# Patient Record
Sex: MALE | Race: BLACK OR AFRICAN AMERICAN | Employment: FULL TIME | URBAN - METROPOLITAN AREA
[De-identification: names, ages, dates, MRNs, and addresses within clinical notes are randomized per-mention and may not be internally consistent; named-entity substitution may affect disease eponyms.]

---

## 2018-04-30 ENCOUNTER — HOSPITAL ENCOUNTER (OUTPATIENT)
Dept: PHYSICAL THERAPY | Age: 48
Discharge: HOME | End: 2018-04-30
Payer: COMMERCIAL

## 2018-04-30 PROCEDURE — 97110 THERAPEUTIC EXERCISES: CPT

## 2018-04-30 PROCEDURE — 97161 PT EVAL LOW COMPLEX 20 MIN: CPT
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
## 2018-05-04 ENCOUNTER — HOSPITAL ENCOUNTER (OUTPATIENT)
Dept: PHYSICAL THERAPY | Age: 48
Discharge: HOME | End: 2018-05-04
Payer: COMMERCIAL

## 2018-05-04 PROCEDURE — 97140 MANUAL THERAPY 1/> REGIONS: CPT | Performed by: PHYSICAL THERAPIST

## 2018-05-04 PROCEDURE — 97110 THERAPEUTIC EXERCISES: CPT | Performed by: PHYSICAL THERAPIST

## 2018-05-04 NOTE — PROGRESS NOTES
[facility]  [address], [address] [facility]    OUTPATIENT physical Therapy DAILY Treatment NOTe  Visit: 2    NAME: Em Alberto AGE: 57 y.o. GENDER: male  DATE: 5/4/2018  REFERRING PHYSICIAN: Fernanda Najera MD      GOALS  Short term goals  Time frame: 3 weeks  1. Patient will be compliant and independent with the initial HEP as evidenced by being able to perform without cueing. 2. Patient will report a 50% improvement in symptoms. 3. Patient report a 50% improvement in sleeping. 4. Patient will tolerate 15 minutes of clinic activities before onset of symptoms.      Long term goals  Time frame: 6 weeks  1. Patient will report pain level decrease to 2/10 to allow increased ease of movement. 2. Patient will have an improved score on the [facility] outcome measure by 10 points to demonstrate an increase in functional activity tolerance. 3. Patient will return to work without being limited by symptoms. 4. Patient will sleep 6-8 hours without being interrupted by pain. SUBJECTIVE:   \"It feels a little better. \"    Pain In: 6-7/10 bilateral neck (left>right) and right mid back    OBJECTIVE DATA SUMMARY:   Objective data from initial evaluation:  EXAMINATION/PRESENTATION/DECISION MAKING:   Pain:  Location:neck, low back and right flank   Quality: aching and burning  Now:8/10  Best: 5/6, when resting or little activity  Factors that improve pain: rest, medication:used and beneficial     Posture:   Rounded shoulders and head forward     Strength:   B/L UE strength WNL     Range of Motion:   Trunk flex/ext 50% of normal painful  Right and Left SB 50% of normal painful     Spinal Assessment: WNL     Joint Mobility:   Lumbar hypomobility with P-A pressure  Cx distraction, gr 1 painful     Palpation:   TTP, suboccipitals, cx paraspinals,rhomboids, right latissimus dorsi, lumbar paraspinals     Neurologic Assessment:                         Tone:  WNL Sensation: normal                         Reflexes: not tested     Special Tests:    + B/L spurlings     Mobility:                         Transitional Movements: WNL                         Gait: WNL     Balance:   WNL     Functional Measure:      In compliance with CMSs Claims Based Outcome Reporting, the following G-code set was chosen for this patient based on their primary functional limitation being treated:     The outcome measure chosen to determine the severity of the functional limitation was the Standard [place] Pain Questionaire with a score of 13/24 which was correlated with the impairment scale.     · Mobility - Walking and Moving Around:                           - CURRENT STATUS:               CK - 40%-59% impaired, limited or restricted                          - GOAL STATUS:                      CI - 1%-19% impaired, limited or restricted                         - D/C STATUS:                                           ---------------To be determined---------------     TREATMENT/INTERVENTION:  Modalities (Rationale):  to decrease pain and muscle guarding  MHP to neck and low back for 10 minutes at end of session; no skin irritation noted     Therapeutic Exercises:  HEP provided on evaluation:  KTC, LTR, Bridges, Cervical flexion and rotation over rolled towel, UT stretch, shoulder blade squeeze, Shoulder shrug, side bend  Added to HEP 5/4/18: Chin tucks, UT stretch, levator scapulae stretch, scalene stretch, lower cervical/upper thoracic stretch, sidelying lumbar rotation stretch, seated trunk rotation stretch, angry cat stretch     LTR  SKTC  Bridges  Thoracolumbar side bend with arms behind head: 5 reps with 5 second holds  Seated trunk rotation: 5 reps with 5 second holds  Sidelying right lumbar rotation stretch    UT stretch: 3 reps with 15 second holds  Levator scapulae stretch: 3 reps with 15 second holds  Scalene stretch: 3 reps with 15 second holds  Lower cervical/upper thoracic stretch: 5 reps with 10 second holds  Shoulder blade squeeze: 10 reps  Shoulder shrugs: 10 reps  Chin tucks: 10 reps with 5 second holds    Angry cat stretch: 10 reps  Forward flexion over blue physioball: 10 reps    Manual Therapy:  STM to bilateral UT, levator scapulae, and cervical paraspinals  Instrument assisted soft tissue mobilization to bilateral UT, levator scapulae, and cervical paraspinals in sitting as well as right thoracic paraspinals in prone. Patient educated on purpose and affect of intervention. Patient visualized affect post intervention and verbalized good understanding.     Neuro Re-Education:  Discussed sitting with lumbar support to realign sitting posture     Activity tolerance and post treatment pain report:   Good  Pain Out: 6/10    Education:  Education was provided to the patient on the following topics  [x]    No changes were made to the home exercise program.  []    The following changes were made to the home exercise program  Patient verbalized understanding of the topics presented. ASSESSMENT:   Patient returns following initial evaluation. Patient presents with decreased pain in neck and mid back. Patient demonstrates pain at bilateral neck (left>right) and right mid back area. Patient does not report any headaches. Patient tolerated additional exercises well; patient provided with additional HEP with good understanding. Patient tolerated IASTM to bilateral UT, levator scapulae, cervical paraspinals, and right thoracic paraspinals well with pain relief noted. Patients progression toward goals is as follows:  [x]     Improving appropriately and progressing toward goals  []     Improving slowly and progressing toward goals  []     Not making progress toward goals and plan of care will be adjusted    PLAN OF CARE:   Patient continues to benefit from skilled intervention to address the above impairments.     [x]    Continue treatment per established plan of care.  []     Recommend the following changes to the plan of care    Recommendations/Intent for next treatment: Rows and pull downs at quantum; reassess response to IASTM    Yanely Zavala, PT   Time Calculation: 50 mins  Patient Time in clinic:   Start Time: 1430   Stop Time: 1520

## 2018-05-08 ENCOUNTER — HOSPITAL ENCOUNTER (OUTPATIENT)
Dept: PHYSICAL THERAPY | Age: 48
Discharge: HOME | End: 2018-05-08
Payer: COMMERCIAL

## 2018-05-08 PROCEDURE — 97140 MANUAL THERAPY 1/> REGIONS: CPT

## 2018-05-08 PROCEDURE — 97110 THERAPEUTIC EXERCISES: CPT

## 2018-05-08 NOTE — PROGRESS NOTES
[facility]  [address], [address] [facility]    OUTPATIENT physical Therapy DAILY Treatment NOTe  Visit: 3    NAME: Kuldeep Hand AGE: 57 y.o. GENDER: male  DATE: 5/8/2018  REFERRING PHYSICIAN: Ottoniel Tony MD      GOALS  Short term goals  Time frame: 3 weeks  1. Patient will be compliant and independent with the initial HEP as evidenced by being able to perform without cueing. 2. Patient will report a 50% improvement in symptoms. 3. Patient report a 50% improvement in sleeping. 4. Patient will tolerate 15 minutes of clinic activities before onset of symptoms.      Long term goals  Time frame: 6 weeks  1. Patient will report pain level decrease to 2/10 to allow increased ease of movement. 2. Patient will have an improved score on the [facility] outcome measure by 10 points to demonstrate an increase in functional activity tolerance. 3. Patient will return to work without being limited by symptoms. 4. Patient will sleep 6-8 hours without being interrupted by pain. SUBJECTIVE:   \"It feels a little better. \"    Pain In: 6-7/10 bilateral neck (left>right) and right mid back    OBJECTIVE DATA SUMMARY:   Objective data from initial evaluation:  EXAMINATION/PRESENTATION/DECISION MAKING:   Pain:  Location:neck, low back and right flank   Quality: aching and burning  Now:8/10  Best: 5/6, when resting or little activity  Factors that improve pain: rest, medication:used and beneficial     Posture:   Rounded shoulders and head forward     Strength:   B/L UE strength WNL     Range of Motion:   Trunk flex/ext 50% of normal painful  Right and Left SB 50% of normal painful     Spinal Assessment: WNL     Joint Mobility:   Lumbar hypomobility with P-A pressure  Cx distraction, gr 1 painful     Palpation:   TTP, suboccipitals, cx paraspinals,rhomboids, right latissimus dorsi, lumbar paraspinals     Neurologic Assessment:                         Tone:  WNL Sensation: normal                         Reflexes: not tested     Special Tests:    + B/L spurlings     Mobility:                         Transitional Movements: WNL                         Gait: WNL     Balance:   WNL     Functional Measure:      In compliance with CMSs Claims Based Outcome Reporting, the following G-code set was chosen for this patient based on their primary functional limitation being treated:     The outcome measure chosen to determine the severity of the functional limitation was the Standard [place] Pain Questionaire with a score of 13/24 which was correlated with the impairment scale.     · Mobility - Walking and Moving Around:                           - CURRENT STATUS:               CK - 40%-59% impaired, limited or restricted                          - GOAL STATUS:                      CI - 1%-19% impaired, limited or restricted                         - D/C STATUS:                                           ---------------To be determined---------------     TREATMENT/INTERVENTION:  Modalities (Rationale):  to decrease pain and muscle guarding  MHP to neck and low back for 10 minutes at end of session; no skin irritation noted     Therapeutic Exercises:  HEP provided on evaluation:  KTC, LTR, Bridges, Cervical flexion and rotation over rolled towel, UT stretch, shoulder blade squeeze, Shoulder shrug, side bend  Added to HEP 5/4/18: Chin tucks, UT stretch, levator scapulae stretch, scalene stretch, lower cervical/upper thoracic stretch, sidelying lumbar rotation stretch, seated trunk rotation stretch, angry cat stretch     LTR  SKTC  Bridges  Single leg right rotation with 20 sec     UT stretch: 3 reps with 15 second holds  Levator scapulae stretch: 3 reps with 15 second holds  Scalene stretch: 3 reps with 15 second holds  Lower cervical/upper thoracic stretch: 5 reps with 10 second holds  Shoulder blade squeeze: 10 reps  Shoulder shrugs: 10 reps  Chin tucks: 10 reps with 5 second holds    Flexion/ext over back of chair x 7 reps  Thoracolumbar side bend with arms behind head: 5 reps with 5 second holds  Seated trunk rotation: 5 reps with 5 second holds      Standing  Rows with green TB x 10 reps  Quantum pull downs 20# x 10 reps    Angry cat stretch: 10 reps  Child's pos x 3 reps with 15 sec hold  Forward flexion over blue physioball: 10 reps    Manual Therapy:  STM to bilateral UT, levator scapulae, and cervical paraspinals  Gr 1 Cx distraction and Cx SG, poor tolerance. Instrument assisted soft tissue mobilization to bilateral UT, levator scapulae, and cervical paraspinals in sitting as well as right thoracic paraspinals in prone. Patient educated on purpose and affect of intervention. Patient visualized affect post intervention and verbalized good understanding.     Neuro Re-Education:  Discussed sitting with lumbar support to realign sitting posture     Activity tolerance and post treatment pain report:   Good  Pain Out: 6/10    Education:  Education was provided to the patient on the following topics  [x]    No changes were made to the home exercise program.  []    The following changes were made to the home exercise program  Patient verbalized understanding of the topics presented. ASSESSMENT:    Patient presents with decreased pain in neck and mid back. Patient demonstrates pain at bilateral neck  and right mid back area. . Pt with good HEP compliance. Patient tolerated IASTM to bilateral UT, levator scapulae, cervical paraspinals. Advanced clinic activities as above, with good tolerance. Patients progression toward goals is as follows:  [x]     Improving appropriately and progressing toward goals  []     Improving slowly and progressing toward goals  []     Not making progress toward goals and plan of care will be adjusted    PLAN OF CARE:   Patient continues to benefit from skilled intervention to address the above impairments.     [x]    Continue treatment per established plan of care.   []     Recommend the following changes to the plan of care    Recommendations/Intent for next treatment: continue to advance exercises, IASTM and possible ES trial    Lit Yoder, PT   Time Calculation: 50 mins  Patient Time in clinic:   Start Time: 1300   Stop Time: 1350

## 2018-05-10 ENCOUNTER — HOSPITAL ENCOUNTER (OUTPATIENT)
Dept: PHYSICAL THERAPY | Age: 48
Discharge: HOME | End: 2018-05-10
Payer: COMMERCIAL

## 2018-05-10 PROCEDURE — 97140 MANUAL THERAPY 1/> REGIONS: CPT | Performed by: PHYSICAL THERAPIST

## 2018-05-10 PROCEDURE — 97112 NEUROMUSCULAR REEDUCATION: CPT | Performed by: PHYSICAL THERAPIST

## 2018-05-10 NOTE — PROGRESS NOTES
[facility]  [address], [address] [facility]    OUTPATIENT physical Therapy DAILY Treatment NOTe  Visit: 4    NAME: Christa Coleman AGE: 57 y.o. GENDER: male  DATE: 5/10/2018  REFERRING PHYSICIAN: Amy Bajwa MD      GOALS  Short term goals  Time frame: 3 weeks  1. Patient will be compliant and independent with the initial HEP as evidenced by being able to perform without cueing. 2. Patient will report a 50% improvement in symptoms. 3. Patient report a 50% improvement in sleeping. 4. Patient will tolerate 15 minutes of clinic activities before onset of symptoms.      Long term goals  Time frame: 6 weeks  1. Patient will report pain level decrease to 2/10 to allow increased ease of movement. 2. Patient will have an improved score on the [employer] outcome measure by 10 points to demonstrate an increase in functional activity tolerance. 3. Patient will return to work without being limited by symptoms. 4. Patient will sleep 6-8 hours without being interrupted by pain. SUBJECTIVE:   \"It feels a little better. \"    Pain In: 6-7/10 bilateral neck (left>right) and 5/10 right mid back    OBJECTIVE DATA SUMMARY:   Objective data from initial evaluation:  EXAMINATION/PRESENTATION/DECISION MAKING:   Pain:  Location:neck, low back and right flank   Quality: aching and burning  Now:8/10  Best: 5/6, when resting or little activity  Factors that improve pain: rest, medication:used and beneficial     Posture:   Rounded shoulders and head forward     Strength:   B/L UE strength WNL     Range of Motion:   Trunk flex/ext 50% of normal painful  Right and Left SB 50% of normal painful     Spinal Assessment: WNL     Joint Mobility:   Lumbar hypomobility with P-A pressure  Cx distraction, gr 1 painful     Palpation:   TTP, suboccipitals, cx paraspinals,rhomboids, right latissimus dorsi, lumbar paraspinals     Neurologic Assessment:                         Tone:  WNL Sensation: normal                         Reflexes: not tested     Special Tests:    + B/L spurlings     Mobility:                         Transitional Movements: WNL                         Gait: WNL     Balance:   WNL     Functional Measure:      In compliance with CMSs Claims Based Outcome Reporting, the following G-code set was chosen for this patient based on their primary functional limitation being treated:     The outcome measure chosen to determine the severity of the functional limitation was the Standard [place] Pain Questionaire with a score of 13/24 which was correlated with the impairment scale.     · Mobility - Walking and Moving Around:                           - CURRENT STATUS:               CK - 40%-59% impaired, limited or restricted                          - GOAL STATUS:                      CI - 1%-19% impaired, limited or restricted                         - D/C STATUS:                           ---------------To be determined---------------     TREATMENT/INTERVENTION:  Modalities (Rationale):  to decrease pain and muscle guarding  MHP to neck and low back for 10 minutes at end of session; no skin irritation noted     Therapeutic Exercises:  HEP provided on evaluation:  KTC, LTR, Bridges, Cervical flexion and rotation over rolled towel, UT stretch, shoulder blade squeeze, Shoulder shrug, side bend  Added to HEP 5/4/18: Chin tucks, UT stretch, levator scapulae stretch, scalene stretch, lower cervical/upper thoracic stretch, sidelying lumbar rotation stretch, seated trunk rotation stretch, angry cat stretch     Exercises in BOLD performed this date:     LTR  SKTC  Bridges  Single leg right rotation with 20 sec     UT stretch: 3 reps with 15 second holds  Levator scapulae stretch: 3 reps with 15 second holds  Scalene stretch: 3 reps with 15 second holds  Lower cervical/upper thoracic stretch: 5 reps with 10 second holds  Shoulder blade squeeze: 10 reps  Shoulder shrugs: 10 reps  Chin tucks: 10 reps with 5 second holds    Flexion/ext over back of chair x 7 reps  Thoracolumbar side bend with arms behind head: 5 reps with 5 second holds  Seated trunk rotation: 5 reps with 5 second holds    Standing  Rows with green TB x 10 reps  Quantum pull downs 20# x 10 reps    Angry cat stretch: 10 reps  Child's pos x 3 reps with 15 sec hold  Forward flexion over blue physioball: 10 reps    Manual Therapy:  STM to bilateral UT, levator scapulae, and cervical paraspinals  Manual cervical traction with suboccipital release  Instrument assisted soft tissue mobilization to bilateral UT, levator scapulae, and cervical paraspinals in sitting as well as right thoracic paraspinals in prone. Patient educated on purpose and affect of intervention. Patient visualized affect post intervention and verbalized good understanding.     Neuro Re-Education:  Discussed sitting with lumbar support to realign sitting posture     Activity tolerance and post treatment pain report:   Good  Pain Out: 6/10    Education:  Education was provided to the patient on the following topics  [x]    No changes were made to the home exercise program.  []    The following changes were made to the home exercise program  Patient verbalized understanding of the topics presented. ASSESSMENT:    Patient presents with decreased pain in neck and mid back. Patient reports no headaches. Patient with good HEP compliance. Patient with pain relief following IASTM to bilateral UT, levator scapulae, and cervical paraspinals. Patient tolerated clinic activities well. Patients progression toward goals is as follows:  [x]     Improving appropriately and progressing toward goals  []     Improving slowly and progressing toward goals  []     Not making progress toward goals and plan of care will be adjusted    PLAN OF CARE:   Patient continues to benefit from skilled intervention to address the above impairments.     [x]    Continue treatment per established plan of care.   []     Recommend the following changes to the plan of care    Recommendations/Intent for next treatment: continue to advance exercises, IASTM and possible ES trial    Ramona Oliveira, PT   Time Calculation: 30 mins  Patient Time in clinic:   Start Time: 1330   Stop Time: 1400

## 2018-05-14 ENCOUNTER — HOSPITAL ENCOUNTER (OUTPATIENT)
Dept: PHYSICAL THERAPY | Age: 48
Discharge: HOME | End: 2018-05-14
Payer: COMMERCIAL

## 2018-05-14 PROCEDURE — 97014 ELECTRIC STIMULATION THERAPY: CPT

## 2018-05-14 PROCEDURE — 97110 THERAPEUTIC EXERCISES: CPT

## 2018-05-14 PROCEDURE — 97140 MANUAL THERAPY 1/> REGIONS: CPT

## 2018-05-14 NOTE — PROGRESS NOTES
[facility]  [address], [address] [facility]    OUTPATIENT physical Therapy DAILY Treatment NOTe  Visit: 5    NAME: Leighton Maya AGE: 57 y.o. GENDER: male  DATE: 5/21/2018  REFERRING PHYSICIAN: Rufina Washington MD      GOALS  Short term goals  Time frame: 3 weeks  1. Patient will be compliant and independent with the initial HEP as evidenced by being able to perform without cueing. 2. Patient will report a 50% improvement in symptoms. 3. Patient report a 50% improvement in sleeping. 4. Patient will tolerate 15 minutes of clinic activities before onset of symptoms.      Long term goals  Time frame: 6 weeks  1. Patient will report pain level decrease to 2/10 to allow increased ease of movement. 2. Patient will have an improved score on the [employer] [facility] outcome measure by 10 points to demonstrate an increase in functional activity tolerance. 3. Patient will return to work without being limited by symptoms. 4. Patient will sleep 6-8 hours without being interrupted by pain. SUBJECTIVE:   \"It feels a little better. \"    Pain In: 5/10 bilateral neck (left>right) and 5/10 right mid back    OBJECTIVE DATA SUMMARY:   Objective data from initial evaluation:  EXAMINATION/PRESENTATION/DECISION MAKING:   Pain:  Location:neck, low back and right flank   Quality: aching and burning  Now:8/10  Best: 5/6, when resting or little activity  Factors that improve pain: rest, medication:used and beneficial     Posture:   Rounded shoulders and head forward     Strength:   B/L UE strength WNL     Range of Motion:   Trunk flex/ext 50% of normal painful  Right and Left SB 50% of normal painful     Spinal Assessment: WNL     Joint Mobility:   Lumbar hypomobility with P-A pressure  Cx distraction, gr 1 painful     Palpation:   TTP, suboccipitals, cx paraspinals,rhomboids, right latissimus dorsi, lumbar paraspinals     Neurologic Assessment:                         Tone:  WNL Sensation: normal                         Reflexes: not tested     Special Tests:    + B/L spurlings     Mobility:                         Transitional Movements: WNL                         Gait: WNL     Balance:   WNL     Functional Measure:      In compliance with CMSs Claims Based Outcome Reporting, the following G-code set was chosen for this patient based on their primary functional limitation being treated:     The outcome measure chosen to determine the severity of the functional limitation was the Standard [place] Pain Questionaire with a score of 13/24 which was correlated with the impairment scale.     · Mobility - Walking and Moving Around:                           - CURRENT STATUS:               CK - 40%-59% impaired, limited or restricted                          - GOAL STATUS:                      CI - 1%-19% impaired, limited or restricted                         - D/C STATUS:                           ---------------To be determined---------------     TREATMENT/INTERVENTION:  Modalities (Rationale):  to decrease pain and muscle guarding  MHP to neck and low back for 10 minutes at end of session; no skin irritation noted    ES B/L UT, levator and right thoracic and lumbar     Therapeutic Exercises:  HEP provided on evaluation:  KTC, LTR, Bridges, Cervical flexion and rotation over rolled towel, UT stretch, shoulder blade squeeze, Shoulder shrug, side bend  Added to HEP 5/4/18: Chin tucks, UT stretch, levator scapulae stretch, scalene stretch, lower cervical/upper thoracic stretch, sidelying lumbar rotation stretch, seated trunk rotation stretch, angry cat stretch     Exercises in BOLD performed this date:     LTR  SKTC  Bridges  Single leg right rotation with 20 sec     UT stretch: 3 reps with 15 second holds  Levator scapulae stretch: 3 reps with 15 second holds  Scalene stretch: 3 reps with 15 second holds  Lower cervical/upper thoracic stretch: 5 reps with 10 second holds  Shoulder blade squeeze: 10 reps  Shoulder shrugs: 10 reps  Chin tucks: 10 reps with 5 second holds    Flexion/ext over back of chair x 7 reps  Thoracolumbar side bend with arms behind head: 5 reps with 5 second holds  Seated trunk rotation: 5 reps with 5 second holds    Standing  Rows with blue TB x 10 reps 2 sets  Pull downs  With blue TB  x 10 reps x 2 sets    Angry cat stretch: 10 reps  Child's pos x 3 reps with 15 sec hold  Forward flexion over blue physioball: 10 reps    Manual Therapy:  STM to bilateral UT, levator scapulae, and cervical paraspinals  Manual cervical traction with suboccipital release  Instrument assisted soft tissue mobilization to bilateral UT, levator scapulae, and cervical paraspinals in sitting as well as right thoracic paraspinals in prone. Patient educated on purpose and affect of intervention. Patient visualized affect post intervention and verbalized good understanding.     Neuro Re-Education:  Discussed sitting with lumbar support to realign sitting posture     Activity tolerance and post treatment pain report:   Good  Pain Out: 6/10    Education:  Education was provided to the patient on the following topics  [x]    No changes were made to the home exercise program.  []    The following changes were made to the home exercise program  Patient verbalized understanding of the topics presented. ASSESSMENT:    Patient presents with decreased pain in neck and mid back. Patient with good HEP compliance. Patient with pain relief following IASTM to bilateral UT, levator scapulae, and cervical paraspinals. Patient tolerated advance in clinic activities well. ES trial, assess response.     Patients progression toward goals is as follows:  [x]     Improving appropriately and progressing toward goals  []     Improving slowly and progressing toward goals  []     Not making progress toward goals and plan of care will be adjusted    PLAN OF CARE:   Patient continues to benefit from skilled intervention to address the above impairments. [x]    Continue treatment per established plan of care.   []     Recommend the following changes to the plan of care    Recommendations/Intent for next treatment: continue to advance exercises, IASTM assess ES trial    Brenda Kelly, PT   Time Calculation: 40 mins  Patient Time in clinic:   Start Time: 1330   Stop Time: 1410

## 2018-05-18 ENCOUNTER — HOSPITAL ENCOUNTER (OUTPATIENT)
Dept: PHYSICAL THERAPY | Age: 48
Discharge: HOME | End: 2018-05-18
Payer: COMMERCIAL

## 2018-05-18 PROCEDURE — 97140 MANUAL THERAPY 1/> REGIONS: CPT | Performed by: PHYSICAL THERAPIST

## 2018-05-18 PROCEDURE — 97014 ELECTRIC STIMULATION THERAPY: CPT | Performed by: PHYSICAL THERAPIST

## 2018-05-18 NOTE — PROGRESS NOTES
[facility]  [address], [address] [facility]    OUTPATIENT physical Therapy DAILY Treatment NOTe  Visit: 6    NAME: Lizzy Stephens AGE: 57 y.o. GENDER: male  DATE: 5/18/2018  REFERRING PHYSICIAN: Lyssa Reid MD      GOALS  Short term goals  Time frame: 3 weeks  1. Patient will be compliant and independent with the initial HEP as evidenced by being able to perform without cueing. 2. Patient will report a 50% improvement in symptoms. 3. Patient report a 50% improvement in sleeping. 4. Patient will tolerate 15 minutes of clinic activities before onset of symptoms.      Long term goals  Time frame: 6 weeks  1. Patient will report pain level decrease to 2/10 to allow increased ease of movement. 2. Patient will have an improved score on the [employer] outcome measure by 10 points to demonstrate an increase in functional activity tolerance. 3. Patient will return to work without being limited by symptoms. 4. Patient will sleep 6-8 hours without being interrupted by pain. SUBJECTIVE:   \"I went back to work on Wednesday so I'm sore. \"    Pain In: 6-7/10 bilateral neck (left>right) and 5/10 right mid back    OBJECTIVE DATA SUMMARY:   Objective data from initial evaluation:  EXAMINATION/PRESENTATION/DECISION MAKING:   Pain:  Location:neck, low back and right flank   Quality: aching and burning  Now:8/10  Best: 5/6, when resting or little activity  Factors that improve pain: rest, medication:used and beneficial     Posture:   Rounded shoulders and head forward     Strength:   B/L UE strength WNL     Range of Motion:   Trunk flex/ext 50% of normal painful  Right and Left SB 50% of normal painful     Spinal Assessment:    WNL     Joint Mobility:   Lumbar hypomobility with P-A pressure  Cx distraction, gr 1 painful     Palpation:   TTP, suboccipitals, cx paraspinals,rhomboids, right latissimus dorsi, lumbar paraspinals     Neurologic Assessment: Tone: WNL                         Sensation: normal                         Reflexes: not tested     Special Tests:    + B/L spurlings     Mobility:                         Transitional Movements: WNL                         Gait: WNL     Balance:   WNL     Functional Measure:      In compliance with CMSs Claims Based Outcome Reporting, the following G-code set was chosen for this patient based on their primary functional limitation being treated:     The outcome measure chosen to determine the severity of the functional limitation was the Standard [place] Pain Questionaire with a score of 13/24 which was correlated with the impairment scale.     · Mobility - Walking and Moving Around:                           - CURRENT STATUS:               CK - 40%-59% impaired, limited or restricted                          - GOAL STATUS:                      CI - 1%-19% impaired, limited or restricted                         - D/C STATUS:                           ---------------To be determined---------------     TREATMENT/INTERVENTION:  Modalities (Rationale):  to decrease pain and muscle guarding  MHP to neck and mid/low back for 15 minutes at end of session; no skin irritation noted  E stim to bilateral cervical paraspinals with MHP     Therapeutic Exercises:  HEP provided on evaluation:  KTC, LTR, Bridges, Cervical flexion and rotation over rolled towel, UT stretch, shoulder blade squeeze, Shoulder shrug, side bend  Added to HEP 5/4/18: Chin tucks, UT stretch, levator scapulae stretch, scalene stretch, lower cervical/upper thoracic stretch, sidelying lumbar rotation stretch, seated trunk rotation stretch, angry cat stretch     Exercises in BOLD performed this date:     LTR  SKTC  Bridges  Single leg right rotation with 20 sec     UT stretch: 3 reps with 15 second holds  Levator scapulae stretch: 3 reps with 15 second holds  Scalene stretch: 3 reps with 15 second holds  Lower cervical/upper thoracic stretch: 5 reps with 10 second holds  Shoulder blade squeeze: 10 reps  Shoulder shrugs: 10 reps  Chin tucks: 10 reps with 5 second holds    Flexion/ext over back of chair x 7 reps  Thoracolumbar side bend with arms behind head: 5 reps with 5 second holds  Seated trunk rotation: 5 reps with 5 second holds    Standing  Rows with blue TB x 10 reps 2 sets  Pull downs  With blue TB  x 10 reps x 2 sets    Angry cat stretch: 10 reps  Child's pos x 3 reps with 15 sec hold  Forward flexion over blue physioball: 10 reps    Manual Therapy: 25 minutes  STM to bilateral UT, levator scapulae, and cervical paraspinals  Manual cervical traction with suboccipital release  Instrument assisted soft tissue mobilization to bilateral UT, levator scapulae, and cervical paraspinals in sitting. Patient educated on purpose and affect of intervention. Patient visualized affect post intervention and verbalized good understanding.     Neuro Re-Education:  Discussed sitting with lumbar support to realign sitting posture     Activity tolerance and post treatment pain report:   Good  Pain Out: 6/10    Education:  Education was provided to the patient on the following topics  [x]    No changes were made to the home exercise program.  []    The following changes were made to the home exercise program  Patient verbalized understanding of the topics presented. ASSESSMENT:   Patient presents with increased pain in neck and mid back. Patient reports returning to work on Wednesday which has increased his neck pain. 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 Discussed proper work set-up with computer to avoid straining neck. Pain is primarily located at bilateral cervical paraspinals. Patient with good HEP compliance. Patient with pain relief following IASTM to bilateral UT, levator scapulae, and cervical paraspinals. Focused on manual therapy today.  Patient with pain relief following e stim to cervical paraspinals. Patients progression toward goals is as follows:  [x]     Improving appropriately and progressing toward goals  []     Improving slowly and progressing toward goals  []     Not making progress toward goals and plan of care will be adjusted    PLAN OF CARE:   Patient continues to benefit from skilled intervention to address the above impairments. [x]    Continue treatment per established plan of care.   []     Recommend the following changes to the plan of care    Recommendations/Intent for next treatment: continue to advance exercises, IASTM, e neetu Moreau, PT   Time Calculation: 50 mins  Patient Time in clinic:   Start Time: 1500   Stop Time: [id number]

## 2018-05-21 ENCOUNTER — HOSPITAL ENCOUNTER (OUTPATIENT)
Dept: PHYSICAL THERAPY | Age: 48
Discharge: HOME | End: 2018-05-21
Payer: COMMERCIAL

## 2018-05-21 PROCEDURE — 97140 MANUAL THERAPY 1/> REGIONS: CPT

## 2018-05-21 PROCEDURE — 97110 THERAPEUTIC EXERCISES: CPT

## 2018-05-21 PROCEDURE — 97014 ELECTRIC STIMULATION THERAPY: CPT

## 2018-05-21 NOTE — PROGRESS NOTES
[facility]  [address], [address] [facility]    OUTPATIENT physical Therapy DAILY Treatment NOTe  Visit: 7    NAME: Jolene Romero AGE: 57 y.o. GENDER: male  DATE: 5/21/2018  REFERRING PHYSICIAN: Jad Johnson MD      GOALS  Short term goals  Time frame: 3 weeks  1. Patient will be compliant and independent with the initial HEP as evidenced by being able to perform without cueing. 2. Patient will report a 50% improvement in symptoms. 3. Patient report a 50% improvement in sleeping. 4. Patient will tolerate 15 minutes of clinic activities before onset of symptoms.      Long term goals  Time frame: 6 weeks  1. Patient will report pain level decrease to 2/10 to allow increased ease of movement. 2. Patient will have an improved score on the [employer] outcome measure by 10 points to demonstrate an increase in functional activity tolerance. 3. Patient will return to work without being limited by symptoms. 4. Patient will sleep 6-8 hours without being interrupted by pain. SUBJECTIVE:   \"I went back to work on Wednesday so I'm sore. \"    Pain In: 6-7/10 bilateral neck (left>right) and 8/10 right mid back    OBJECTIVE DATA SUMMARY:   Objective data from initial evaluation:  EXAMINATION/PRESENTATION/DECISION MAKING:   Pain:  Location:neck, low back and right flank   Quality: aching and burning  Now:8/10  Best: 5/6, when resting or little activity  Factors that improve pain: rest, medication:used and beneficial     Posture:   Rounded shoulders and head forward     Strength:   B/L UE strength WNL     Range of Motion:   Trunk flex/ext 50% of normal painful  Right and Left SB 50% of normal painful     Spinal Assessment:    WNL     Joint Mobility:   Lumbar hypomobility with P-A pressure  Cx distraction, gr 1 painful     Palpation:   TTP, suboccipitals, cx paraspinals,rhomboids, right latissimus dorsi, lumbar paraspinals     Neurologic Assessment: Tone: WNL                         Sensation: normal                         Reflexes: not tested     Special Tests:    + B/L spurlings     Mobility:                         Transitional Movements: WNL                         Gait: WNL     Balance:   WNL     Functional Measure:      In compliance with CMSs Claims Based Outcome Reporting, the following G-code set was chosen for this patient based on their primary functional limitation being treated:     The outcome measure chosen to determine the severity of the functional limitation was the Standard [place] Pain Questionaire with a score of 13/24 which was correlated with the impairment scale.     · Mobility - Walking and Moving Around:                           - CURRENT STATUS:               CK - 40%-59% impaired, limited or restricted                          - GOAL STATUS:                      CI - 1%-19% impaired, limited or restricted                         - D/C STATUS:                           ---------------To be determined---------------     TREATMENT/INTERVENTION:  Modalities (Rationale):  to decrease pain and muscle guarding  MHP to neck and mid/low back for 15 minutes at end of session; no skin irritation noted  E stim to bilateral cervical paraspinals with MHP     Therapeutic Exercises:  HEP provided on evaluation:  KTC, LTR, Bridges, Cervical flexion and rotation over rolled towel, UT stretch, shoulder blade squeeze, Shoulder shrug, side bend  Added to HEP 5/4/18: Chin tucks, UT stretch, levator scapulae stretch, scalene stretch, lower cervical/upper thoracic stretch, sidelying lumbar rotation stretch, seated trunk rotation stretch, angry cat stretch     Exercises in BOLD performed this date:     LTR  SKTC  Bridges  Single leg right rotation with 20 sec     UT stretch: 3 reps with 15 second holds  Levator scapulae stretch: 3 reps with 15 second holds  Scalene stretch: 3 reps with 15 second holds  Lower cervical/upper thoracic stretch: 5 reps with 10 second holds  Shoulder blade squeeze: 10 reps  Shoulder shrugs: 10 reps  Chin tucks: 10 reps with 5 second holds    Flexion/ext over back of chair x 7 reps  Thoracolumbar side bend with arms behind head: 5 reps with 5 second holds  Seated trunk rotation: 5 reps with 5 second holds    Standing  Rows with blue TB x 10 reps 2 sets  Pull downs  With blue TB  x 10 reps x 2 sets    Angry cat stretch: 10 reps  Child's pos x 3 reps with 15 sec hold  Forward flexion over blue physioball: 10 reps    Manual Therapy: 25 minutes  STM to bilateral UT, levator scapulae, and cervical paraspinals  Manual cervical traction with suboccipital release  Instrument assisted soft tissue mobilization to bilateral UT, levator scapulae, and cervical paraspinals in sitting. Patient educated on purpose and affect of intervention. Patient visualized affect post intervention and verbalized good understanding.     Neuro Re-Education:  Discussed sitting with lumbar support to realign sitting posture     Activity tolerance and post treatment pain report:   Good  Pain Out: 6/10    Education:  Education was provided to the patient on the following topics  [x]    No changes were made to the home exercise program.  []    The following changes were made to the home exercise program  Patient verbalized understanding of the topics presented. ASSESSMENT:   Patient presents with increased pain in neck and mid back. Pt returned to work last Wednesday and has had  increased his neck pain. 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 Pain is primarily located at bilateral cervical paraspinals and right mid back. Patient with good HEP compliance. Pain relief following IASTM to bilateral UT, levator scapulae, and cervical paraspinals. Focused on  Stretches and manual therapy today. Patient with pain relief following e stim to cervical paraspinals.      Patients progression toward goals is as follows:  [x]     Improving appropriately and progressing toward goals  []     Improving slowly and progressing toward goals  []     Not making progress toward goals and plan of care will be adjusted    PLAN OF CARE:   Patient continues to benefit from skilled intervention to address the above impairments. [x]    Continue treatment per established plan of care.   []     Recommend the following changes to the plan of care    Recommendations/Intent for next treatment: continue to advance exercises, IASTM, e neetu Salmon PT     Patient Time in clinic:

## 2018-05-23 ENCOUNTER — HOSPITAL ENCOUNTER (OUTPATIENT)
Dept: PHYSICAL THERAPY | Age: 48
Discharge: HOME | End: 2018-05-23
Payer: COMMERCIAL

## 2018-05-23 PROCEDURE — 97110 THERAPEUTIC EXERCISES: CPT | Performed by: PHYSICAL THERAPIST

## 2018-05-23 PROCEDURE — 97140 MANUAL THERAPY 1/> REGIONS: CPT | Performed by: PHYSICAL THERAPIST

## 2018-05-23 PROCEDURE — 97014 ELECTRIC STIMULATION THERAPY: CPT | Performed by: PHYSICAL THERAPIST

## 2018-05-23 NOTE — PROGRESS NOTES
[facility]  [address], [address] [facility]    OUTPATIENT physical Therapy DAILY Treatment NOTe  Visit: 8    NAME: Madelaine Zamora AGE: 57 y.o. GENDER: male  DATE: 5/23/2018  REFERRING PHYSICIAN: Gilford Lewandowsky, MD      GOALS  Short term goals  Time frame: 3 weeks  1. Patient will be compliant and independent with the initial HEP as evidenced by being able to perform without cueing. 2. Patient will report a 50% improvement in symptoms. 3. Patient report a 50% improvement in sleeping. 4. Patient will tolerate 15 minutes of clinic activities before onset of symptoms.      Long term goals  Time frame: 6 weeks  1. Patient will report pain level decrease to 2/10 to allow increased ease of movement. 2. Patient will have an improved score on the [employer] [facility] outcome measure by 10 points to demonstrate an increase in functional activity tolerance. 3. Patient will return to work without being limited by symptoms. 4. Patient will sleep 6-8 hours without being interrupted by pain. SUBJECTIVE:   \"It's still pretty sore. \"    Pain In: 6/10 bilateral neck (left>right) and /10 right mid back    OBJECTIVE DATA SUMMARY:   Objective data from initial evaluation:  EXAMINATION/PRESENTATION/DECISION MAKING:   Pain:  Location:neck, low back and right flank   Quality: aching and burning  Now:8/10  Best: 5/6, when resting or little activity  Factors that improve pain: rest, medication:used and beneficial     Posture:   Rounded shoulders and head forward     Strength:   B/L UE strength WNL     Range of Motion:   Trunk flex/ext 50% of normal painful  Right and Left SB 50% of normal painful     Spinal Assessment: WNL     Joint Mobility:   Lumbar hypomobility with P-A pressure  Cx distraction, gr 1 painful     Palpation:   TTP, suboccipitals, cx paraspinals,rhomboids, right latissimus dorsi, lumbar paraspinals     Neurologic Assessment:                         Tone:  WNL Sensation: normal                         Reflexes: not tested     Special Tests:    + B/L spurlings     Mobility:                         Transitional Movements: WNL                         Gait: WNL     Balance:   WNL     Functional Measure:      In compliance with CMSs Claims Based Outcome Reporting, the following G-code set was chosen for this patient based on their primary functional limitation being treated:     The outcome measure chosen to determine the severity of the functional limitation was the Standard [place] Pain Questionaire with a score of 13/24 which was correlated with the impairment scale.     · Mobility - Walking and Moving Around:                           - CURRENT STATUS:               CK - 40%-59% impaired, limited or restricted                          - GOAL STATUS:                      CI - 1%-19% impaired, limited or restricted                         - D/C STATUS:                           ---------------To be determined---------------     TREATMENT/INTERVENTION:  Modalities (Rationale):  to decrease pain and muscle guarding  MHP to neck and mid/low back for 15 minutes at end of session; no skin irritation noted  E stim to bilateral cervical and thoracic paraspinals with MHP     Therapeutic Exercises:  HEP provided on evaluation:  KTC, LTR, Bridges, Cervical flexion and rotation over rolled towel, UT stretch, shoulder blade squeeze, Shoulder shrug, side bend  Added to HEP 5/4/18: Chin tucks, UT stretch, levator scapulae stretch, scalene stretch, lower cervical/upper thoracic stretch, sidelying lumbar rotation stretch, seated trunk rotation stretch, angry cat stretch     Exercises in BOLD performed this date:     LTR  SKTC  Bridges  Single leg right rotation with 20 sec     UT stretch: 3 reps with 15 second holds  Levator scapulae stretch: 3 reps with 15 second holds  Scalene stretch: 3 reps with 15 second holds  Lower cervical/upper thoracic stretch: 5 reps with 10 second holds  Shoulder blade squeeze: 10 reps  Shoulder shrugs: 10 reps  Chin tucks: 10 reps with 5 second holds    Flexion/ext over back of chair x 7 reps  Thoracolumbar side bend with arms behind head: 5 reps with 5 second holds  Seated trunk rotation: 5 reps with 5 second holds    Standing  Rows with blue TB x 10 reps 2 sets  Pull downs  With blue TB  x 10 reps x 2 sets    Angry cat stretch: 10 reps with 5 second holds  Child's pose: 5 reps with 10 sec hold  Forward flexion over blue physioball: 10 reps    Manual Therapy: 25 minutes  STM to bilateral UT and cervical and thoracic paraspinals  Manual cervical traction with suboccipital release in supine  Instrument assisted soft tissue mobilization to right thoracic paraspinals in prone. Patient educated on purpose and affect of intervention. Patient visualized affect post intervention and verbalized good understanding.     Neuro Re-Education:  Discussed sitting with lumbar support to realign sitting posture     Activity tolerance and post treatment pain report:   Good  Pain Out: 6/10    Education:  Education was provided to the patient on the following topics  [x]    No changes were made to the home exercise program.  []    The following changes were made to the home exercise program  Patient verbalized understanding of the topics presented. ASSESSMENT:   Patient presents with decreased pain in neck and mid back. Patient returned to work last week which has increased his pain but he ferrara been able to take breaks and complete some exercises at work as needed. 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 Pain is primarily located at bilateral cervical paraspinals and right mid back. Patient with good HEP compliance. Pain relief following IASTM to right thoracic paraspinals. Focused on manual therapy today. Patient with pain relief following e stim to cervical and thoracic paraspinals.      Patients progression toward goals is as follows:  [x]     Improving appropriately and progressing toward goals  []     Improving slowly and progressing toward goals  []     Not making progress toward goals and plan of care will be adjusted    PLAN OF CARE:   Patient continues to benefit from skilled intervention to address the above impairments. [x]    Continue treatment per established plan of care.   []     Recommend the following changes to the plan of care    Recommendations/Intent for next treatment: continue to advance exercises, IASTM, e stim    Carlotta Dorsey, PT   Time Calculation: 60 mins  Patient Time in clinic:   Start Time: 1500   Stop Time: 1600

## 2018-05-29 ENCOUNTER — HOSPITAL ENCOUNTER (OUTPATIENT)
Dept: PHYSICAL THERAPY | Age: 48
Discharge: HOME | End: 2018-05-29
Payer: COMMERCIAL

## 2018-05-29 PROCEDURE — 97014 ELECTRIC STIMULATION THERAPY: CPT

## 2018-05-29 PROCEDURE — 97110 THERAPEUTIC EXERCISES: CPT

## 2018-05-29 PROCEDURE — 97140 MANUAL THERAPY 1/> REGIONS: CPT

## 2018-05-29 NOTE — PROGRESS NOTES
[facility]  [address], [address] [facility]    OUTPATIENT physical Therapy DAILY Treatment NOTe  Visit: 9    NAME: Caitlyn Garcia AGE: 57 y.o. GENDER: male  DATE: 5/29/2018  REFERRING PHYSICIAN: Aminata Ngo MD      GOALS  Short term goals  Time frame: 3 weeks  1. Patient will be compliant and independent with the initial HEP as evidenced by being able to perform without cueing. 2. Patient will report a 50% improvement in symptoms. 3. Patient report a 50% improvement in sleeping. 4. Patient will tolerate 15 minutes of clinic activities before onset of symptoms.      Long term goals  Time frame: 6 weeks  1. Patient will report pain level decrease to 2/10 to allow increased ease of movement. 2. Patient will have an improved score on the [employer] [facility] outcome measure by 10 points to demonstrate an increase in functional activity tolerance. 3. Patient will return to work without being limited by symptoms. 4. Patient will sleep 6-8 hours without being interrupted by pain. SUBJECTIVE:   \"I feel a little better after the holiday. I have been resting around my house. \"    Pain In: 5/10 bilateral neck (left>right) and 4/10 right mid back      OBJECTIVE DATA SUMMARY:   Objective data from initial evaluation:  EXAMINATION/PRESENTATION/DECISION MAKING:   Pain:  Location:neck, low back and right flank   Quality: aching and burning  Now:8/10  Best: 5/6, when resting or little activity  Factors that improve pain: rest, medication:used and beneficial     Posture:   Rounded shoulders and head forward     Strength:   B/L UE strength WNL     Range of Motion:   Trunk flex/ext 50% of normal painful  Right and Left SB 50% of normal painful     Spinal Assessment:    WNL     Joint Mobility:   Lumbar hypomobility with P-A pressure  Cx distraction, gr 1 painful     Palpation:   TTP, suboccipitals, cx paraspinals,rhomboids, right latissimus dorsi, lumbar paraspinals     Neurologic Assessment:                         Tone: WNL                         Sensation: normal                         Reflexes: not tested     Special Tests:    + B/L spurlings     Mobility:                         Transitional Movements: WNL                         Gait: WNL     Balance:   WNL     Functional Measure:      In compliance with CMSs Claims Based Outcome Reporting, the following G-code set was chosen for this patient based on their primary functional limitation being treated:     The outcome measure chosen to determine the severity of the functional limitation was the Standard [place] Pain Questionaire with a score of 13/24 which was correlated with the impairment scale.     · Mobility - Walking and Moving Around:                           - CURRENT STATUS:               CK - 40%-59% impaired, limited or restricted                          - GOAL STATUS:                      CI - 1%-19% impaired, limited or restricted                         - D/C STATUS:                           ---------------To be determined---------------     TREATMENT/INTERVENTION:  Modalities (Rationale):  to decrease pain and muscle guarding  MHP to neck and mid/low back for 15 minutes at end of session; no skin irritation noted  E stim to bilateral cervical and thoracic paraspinals with MHP     Therapeutic Exercises:  HEP provided on evaluation:  KTC, LTR, Bridges, Cervical flexion and rotation over rolled towel, UT stretch, shoulder blade squeeze, Shoulder shrug, side bend  Added to HEP 5/4/18: Chin tucks, UT stretch, levator scapulae stretch, scalene stretch, lower cervical/upper thoracic stretch, sidelying lumbar rotation stretch, seated trunk rotation stretch, angry cat stretch     Exercises in BOLD performed this date:     UBE/LBE x5 min level 2 (legs only after 2:30)    LTR  SKTC  Bridges  Single leg right rotation with 20 sec     UT stretch: 3 reps with 15 second holds  Levator scapulae stretch: 3 reps with 15 second holds  Scalene stretch: 3 reps with 15 second holds  Lower cervical/upper thoracic stretch: 5 reps with 10 second holds  Shoulder blade squeeze: 10 reps  Shoulder shrugs: 10 reps  Chin tucks: 10 reps with 5 second holds    Forward flexion over blue physioball: 10 reps  Flexion/ext over back of chair x 7 reps  Thoracolumbar side bend with arms behind head: 5 reps with 5 second holds  Seated trunk rotation: 5 reps with 5 second holds    Standing  Rows with blue TB x 10 reps 2 sets  Pull downs  With blue TB  x 10 reps x 2 sets  Quantum Rows 25# 2x10  Quantum pull downs 25# 2x10      Angry cat stretch: 10 reps with 5 second holds  Child's pose: 5 reps with 10 sec hold      Manual Therapy: 25 minutes  STM to bilateral UT and cervical and thoracic paraspinals  Manual cervical traction with suboccipital release in supine  Instrument assisted soft tissue mobilization to right thoracic paraspinals in prone. Patient educated on purpose and affect of intervention. Patient visualized affect post intervention and verbalized good understanding.     Neuro Re-Education:  Discussed sitting with lumbar support to realign sitting posture     Activity tolerance and post treatment pain report:   Good  Pain Out: 3-4/10    Education:  Education was provided to the patient on the following topics  [x]    No changes were made to the home exercise program.  []    The following changes were made to the home exercise program  Patient verbalized understanding of the topics presented. ASSESSMENT:   Patient reports continued discomfort at work but is compliant with HEP and is able to take breaks appropriately. Patient responded well to clinical intervention with reduced pain following tx. Cervical and left shoulder pain are his biggest complaints at this time and are improving slowly. Guarded with ROM and will continue to work on this in addition to stabilization.     Patients progression toward goals is as follows:  [x] Improving appropriately and progressing toward goals  []     Improving slowly and progressing toward goals  []     Not making progress toward goals and plan of care will be adjusted    PLAN OF CARE:   Patient continues to benefit from skilled intervention to address the above impairments. [x]    Continue treatment per established plan of care.   []     Recommend the following changes to the plan of care    Recommendations/Intent for next treatment: continue to advance exercises, IASTM, e stim    Mychal Cohen, PT, DPT   Time Calculation: 62 mins  Patient Time in clinic:   Start Time: 1510   Stop Time: [id number]

## 2018-05-30 ENCOUNTER — APPOINTMENT (OUTPATIENT)
Dept: PHYSICAL THERAPY | Age: 48
End: 2018-05-30
Payer: COMMERCIAL

## 2018-06-01 ENCOUNTER — HOSPITAL ENCOUNTER (OUTPATIENT)
Dept: PHYSICAL THERAPY | Age: 48
Discharge: HOME | End: 2018-06-01
Payer: COMMERCIAL

## 2018-06-01 PROCEDURE — 97110 THERAPEUTIC EXERCISES: CPT | Performed by: PHYSICAL THERAPIST

## 2018-06-01 PROCEDURE — 97014 ELECTRIC STIMULATION THERAPY: CPT | Performed by: PHYSICAL THERAPIST

## 2018-06-01 PROCEDURE — 97140 MANUAL THERAPY 1/> REGIONS: CPT | Performed by: PHYSICAL THERAPIST

## 2018-06-01 NOTE — PROGRESS NOTES
[facility]  [address], [address] [facility]    OUTPATIENT physical Therapy DAILY Treatment NOTe  Visit: 10    NAME: Jovanni Galindo AGE: 57 y.o. GENDER: male  DATE: 6/1/2018  REFERRING PHYSICIAN: Az Carlton MD      GOALS  Short term goals  Time frame: 3 weeks  1. Patient will be compliant and independent with the initial HEP as evidenced by being able to perform without cueing. 2. Patient will report a 50% improvement in symptoms. 3. Patient report a 50% improvement in sleeping. 4. Patient will tolerate 15 minutes of clinic activities before onset of symptoms.      Long term goals  Time frame: 6 weeks  1. Patient will report pain level decrease to 2/10 to allow increased ease of movement. 2. Patient will have an improved score on the [employer] Courtney outcome measure by 10 points to demonstrate an increase in functional activity tolerance. 3. Patient will return to work without being limited by symptoms. 4. Patient will sleep 6-8 hours without being interrupted by pain. SUBJECTIVE:   \"I have a slight headache now. It's really just on the left side\"    Pain In: 7/10 bilateral neck (left) and 3/10 right mid back    OBJECTIVE DATA SUMMARY:   Objective data from initial evaluation:  EXAMINATION/PRESENTATION/DECISION MAKING:   Pain:  Location:neck, low back and right flank   Quality: aching and burning  Now:8/10  Best: 5/6, when resting or little activity  Factors that improve pain: rest, medication:used and beneficial     Posture:   Rounded shoulders and head forward     Strength:   B/L UE strength WNL     Range of Motion:   Trunk flex/ext 50% of normal painful  Right and Left SB 50% of normal painful     Spinal Assessment:    WNL     Joint Mobility:   Lumbar hypomobility with P-A pressure  Cx distraction, gr 1 painful     Palpation:   TTP, suboccipitals, cx paraspinals,rhomboids, right latissimus dorsi, lumbar paraspinals     Neurologic Assessment: Tone: WNL                         Sensation: normal                         Reflexes: not tested     Special Tests:    + B/L spurlings     Mobility:                         Transitional Movements: WNL                         Gait: WNL     Balance:   WNL     Functional Measure:      In compliance with CMSs Claims Based Outcome Reporting, the following G-code set was chosen for this patient based on their primary functional limitation being treated:     The outcome measure chosen to determine the severity of the functional limitation was the Standard [place] Pain Questionaire with a score of 13/24 which was correlated with the impairment scale.     · Mobility - Walking and Moving Around:                           - CURRENT STATUS:               CK - 40%-59% impaired, limited or restricted                          - GOAL STATUS:                      CI - 1%-19% impaired, limited or restricted                         - D/C STATUS:                           ---------------To be determined---------------     TREATMENT/INTERVENTION:  Modalities (Rationale):  to decrease pain and muscle guarding  MHP to neck for 15 minutes at end of session; no skin irritation noted  E stim to bilateral cervical paraspinals with MHP     Therapeutic Exercises:  HEP provided on evaluation:  KTC, LTR, Bridges, Cervical flexion and rotation over rolled towel, UT stretch, shoulder blade squeeze, Shoulder shrug, side bend  Added to HEP 5/4/18: Chin tucks, UT stretch, levator scapulae stretch, scalene stretch, lower cervical/upper thoracic stretch, sidelying lumbar rotation stretch, seated trunk rotation stretch, angry cat stretch     Exercises in BOLD performed this date:     UBE/LBE x5 min level 2 (legs only after 2:30)    LTR  SKTC  Bridges  Single leg right rotation with 20 sec     UT stretch: 3 reps with 15 second holds  Levator scapulae stretch: 3 reps with 15 second holds  Scalene stretch: 3 reps with 15 second holds  Lower cervical/upper thoracic stretch: 5 reps with 10 second holds  Shoulder blade squeeze: 10 reps  Shoulder shrugs: 10 reps  Chin tucks: 10 reps with 5 second holds    Forward flexion over blue physioball: 10 reps  Flexion/ext over back of chair x 7 reps  Thoracolumbar side bend with arms behind head: 5 reps with 5 second holds  Seated trunk rotation: 5 reps with 5 second holds    Standing:  Quantum Rows 25#, sittin sets of 10 reps  Quantum pull downs 25#: 2 sets of 10 reps    Angry cat stretch: 10 reps with 5 second holds  Child's pose: 5 reps with 10 sec hold    Manual Therapy:   STM to bilateral UT and cervical and thoracic paraspinals  Manual cervical traction with suboccipital release in supine  Instrument assisted soft tissue mobilization to left cervical paraspinals in sitting. Patient educated on purpose and affect of intervention. Patient visualized affect post intervention and verbalized good understanding.     Neuro Re-Education:  Discussed sitting with lumbar support to realign sitting posture     Activity tolerance and post treatment pain report:   Good  Pain Out: 3/10    Education:  Education was provided to the patient on the following topics  [x]    No changes were made to the home exercise program.  []    The following changes were made to the home exercise program  Patient verbalized understanding of the topics presented. ASSESSMENT:   Patient reports continued discomfort at left side of neck but overall decrease from start of therapy. Patient is compliant with HEP and is able to take breaks at work. Patient demonstrates most pain at left cervical paraspinals. Patient tolerated IASTM to this area well. Patient responded well to clinical intervention with reduced pain following treatment.     Patients progression toward goals is as follows:  [x]     Improving appropriately and progressing toward goals  []     Improving slowly and progressing toward goals  []     Not making progress toward goals and plan of care will be adjusted    PLAN OF CARE:   Patient continues to benefit from skilled intervention to address the above impairments. [x]    Continue treatment per established plan of care.   []     Recommend the following changes to the plan of care    Recommendations/Intent for next treatment: continue to advance exercises, JAMES, e neetu Rascon, PT   Time Calculation: 56 mins  Patient Time in clinic:   Start Time: 1300   Stop Time: [id number]

## 2018-06-06 ENCOUNTER — HOSPITAL ENCOUNTER (OUTPATIENT)
Dept: PHYSICAL THERAPY | Age: 48
Discharge: HOME | End: 2018-06-06
Payer: COMMERCIAL

## 2018-06-06 PROCEDURE — 97140 MANUAL THERAPY 1/> REGIONS: CPT

## 2018-06-06 PROCEDURE — 97110 THERAPEUTIC EXERCISES: CPT

## 2018-06-06 PROCEDURE — 97014 ELECTRIC STIMULATION THERAPY: CPT

## 2018-06-06 NOTE — PROGRESS NOTES
[facility]  [address], [address] [facility]    OUTPATIENT physical Therapy DAILY Treatment NOTe  Visit: 11    NAME: Agustina Prescott AGE: 57 y.o. GENDER: male  DATE: 6/6/2018  REFERRING PHYSICIAN: Cecil Day MD      GOALS  Short term goals  Time frame: 3 weeks  1. Patient will be compliant and independent with the initial HEP as evidenced by being able to perform without cueing. 2. Patient will report a 50% improvement in symptoms. 3. Patient report a 50% improvement in sleeping. 4. Patient will tolerate 15 minutes of clinic activities before onset of symptoms.      Long term goals  Time frame: 6 weeks  1. Patient will report pain level decrease to 2/10 to allow increased ease of movement. 2. Patient will have an improved score on the Marion Fredi outcome measure by 10 points to demonstrate an increase in functional activity tolerance. 3. Patient will return to work without being limited by symptoms. 4. Patient will sleep 6-8 hours without being interrupted by pain. SUBJECTIVE:   \"I my low back is feeling better than when I started. The left side of my neck is \"    Pain In: 3-4/10 neck (left)    OBJECTIVE DATA SUMMARY:   Objective data from initial evaluation:  EXAMINATION/PRESENTATION/DECISION MAKING:   Pain:  Location:neck, low back and right flank   Quality: aching and burning  Now:8/10  Best: 5/6, when resting or little activity  Factors that improve pain: rest, medication:used and beneficial     Posture:   Rounded shoulders and head forward     Strength:   B/L UE strength WNL     Range of Motion:   Trunk flex/ext 50% of normal painful  Right and Left SB 50% of normal painful     Spinal Assessment:    WNL     Joint Mobility:   Lumbar hypomobility with P-A pressure  Cx distraction, gr 1 painful     Palpation:   TTP, suboccipitals, cx paraspinals,rhomboids, right latissimus dorsi, lumbar paraspinals     Neurologic Assessment: Tone: WNL                         Sensation: normal                         Reflexes: not tested     Special Tests:    + B/L spurlings     Mobility:                         Transitional Movements: WNL                         Gait: WNL     Balance:   WNL     Functional Measure:      In compliance with CMSs Claims Based Outcome Reporting, the following G-code set was chosen for this patient based on their primary functional limitation being treated:     The outcome measure chosen to determine the severity of the functional limitation was the Standard [place] Pain Questionaire with a score of 13/24 which was correlated with the impairment scale.     · Mobility - Walking and Moving Around:                           - CURRENT STATUS:               CK - 40%-59% impaired, limited or restricted                          - GOAL STATUS:                      CI - 1%-19% impaired, limited or restricted                         - D/C STATUS:                           ---------------To be determined---------------     TREATMENT/INTERVENTION:  Modalities (Rationale):  to decrease pain and muscle guarding  MHP to neck for 15 minutes at end of session; no skin irritation noted  E stim to bilateral cervical paraspinals with MHP     Therapeutic Exercises:  HEP provided on evaluation:  KTC, LTR, Bridges, Cervical flexion and rotation over rolled towel, UT stretch, shoulder blade squeeze, Shoulder shrug, side bend  Added to HEP 5/4/18: Chin tucks, UT stretch, levator scapulae stretch, scalene stretch, lower cervical/upper thoracic stretch, sidelying lumbar rotation stretch, seated trunk rotation stretch, angry cat stretch     Exercises in BOLD performed this date:     UBE x5 min level 2     LTR  SKTC  Bridges  Single leg right rotation with 20 sec     UT stretch: 3 reps with 15 second holds  Levator scapulae stretch: 3 reps with 15 second holds  Scalene stretch: 3 reps with 15 second holds  Lower cervical/upper thoracic stretch: 5 reps with 10 second holds  Shoulder blade squeeze: 10 reps  Shoulder shrugs: 10 reps  Chin tucks: 10 reps with 5 second holds    Forward flexion over blue physioball: 10 reps  Flexion/ext over back of chair x 7 reps  Thoracolumbar side bend with arms behind head: 5 reps with 5 second holds  Seated trunk rotation: 5 reps with 5 second holds    Standing:  Quantum Rows 25#, sittin sets of 10 reps  Quantum pull downs 25#: 2 sets of 10 reps    Angry cat stretch: 10 reps with 5 second holds  Child's pose: 5 reps with 10 sec hold     Manual Therapy:   STM to bilateral UT and cervical and thoracic paraspinals  Manual cervical traction with suboccipital release in supine  Instrument assisted soft tissue mobilization to left cervical paraspinals in sitting. Patient educated on purpose and affect of intervention. Patient visualized affect post intervention and verbalized good understanding.     Neuro Re-Education:  Discussed sitting with lumbar support to realign sitting posture     Activity tolerance and post treatment pain report:   Good  Pain Out: 3/10    Education:  Education was provided to the patient on the following topics  [x]    No changes were made to the home exercise program.  []    The following changes were made to the home exercise program  Patient verbalized understanding of the topics presented. ASSESSMENT:   Patient reports continued improvement with manageable lumbar sx but slow change in pain in the left UT. Pain management is improved when he is able to get up and move about at work. Will continue to address UT and LS pain in L > R.     Patients progression toward goals is as follows:  [x]     Improving appropriately and progressing toward goals  []     Improving slowly and progressing toward goals  []     Not making progress toward goals and plan of care will be adjusted    PLAN OF CARE:   Patient continues to benefit from skilled intervention to address the above impairments. [x]    Continue treatment per established plan of care.   []     Recommend the following changes to the plan of care    Recommendations/Intent for next treatment: continue to advance exercises, IASTM, e neetu Conroy, PT, DPT   Time Calculation: 60 mins  Patient Time in clinic:   Start Time: 1500   Stop Time: 1600

## 2018-06-08 ENCOUNTER — HOSPITAL ENCOUNTER (OUTPATIENT)
Dept: PHYSICAL THERAPY | Age: 48
Discharge: HOME | End: 2018-06-08
Payer: COMMERCIAL

## 2018-06-08 PROCEDURE — 97014 ELECTRIC STIMULATION THERAPY: CPT | Performed by: PHYSICAL THERAPIST

## 2018-06-08 PROCEDURE — 97110 THERAPEUTIC EXERCISES: CPT | Performed by: PHYSICAL THERAPIST

## 2018-06-08 PROCEDURE — 97140 MANUAL THERAPY 1/> REGIONS: CPT | Performed by: PHYSICAL THERAPIST

## 2018-06-08 NOTE — PROGRESS NOTES
[facility]  [address], [address] [facility]    OUTPATIENT physical Therapy DAILY Treatment NOTe  Visit: 12    NAME: Hattie Sommer AGE: 57 y.o. GENDER: male  DATE: 6/8/2018  REFERRING PHYSICIAN: Diana Wilcox MD      GOALS  Short term goals  Time frame: 3 weeks  1. Patient will be compliant and independent with the initial HEP as evidenced by being able to perform without cueing. MET  2. Patient will report a 50% improvement in symptoms. MET  3. Patient report a 50% improvement in sleeping. MET  4. Patient will tolerate 15 minutes of clinic activities before onset of symptoms. MET     Long term goals  Time frame: 6 weeks  1. Patient will report pain level decrease to 2/10 to allow increased ease of movement. 2. Patient will have an improved score on the TX[employer] Courtney outcome measure by 10 points to demonstrate an increase in functional activity tolerance. 3. Patient will return to work without being limited by symptoms. 4. Patient will sleep 6-8 hours without being interrupted by pain. SUBJECTIVE:   \"It's mainly just the left side of my neck\"    Pain In: 6/10 neck (left); 1/10 low back    OBJECTIVE DATA SUMMARY:   Objective data from initial evaluation:  EXAMINATION/PRESENTATION/DECISION MAKING:   Pain:  Location:neck, low back  Quality: aching and burning  Now:8/10  Best: 5/6, when resting or little activity  Factors that improve pain: rest, medication:used and beneficial     Posture:   Rounded shoulders and head forward     Strength:   B/L UE strength WNL     Range of Motion:   Trunk flex/ext 50% of normal painful  Right and Left SB 50% of normal painful     Spinal Assessment: WNL     Joint Mobility:   Lumbar hypomobility with P-A pressure  Cx distraction, gr 1 painful     Palpation:   TTP, suboccipitals, cx paraspinals,rhomboids, right latissimus dorsi, lumbar paraspinals     Neurologic Assessment:                         Tone:  WNL Sensation: normal                         Reflexes: not tested     Special Tests:    + B/L spurlings     Mobility:                         Transitional Movements: WNL                         Gait: WNL     Balance:   WNL     Functional Measure:      In compliance with CMSs Claims Based Outcome Reporting, the following G-code set was chosen for this patient based on their primary functional limitation being treated:     The outcome measure chosen to determine the severity of the functional limitation was the Standard [place] Pain Questionaire with a score of 13/24 which was correlated with the impairment scale.     · Mobility - Walking and Moving Around:                           - CURRENT STATUS:               CK - 40%-59% impaired, limited or restricted                          - GOAL STATUS:                      CI - 1%-19% impaired, limited or restricted                         - D/C STATUS:                           ---------------To be determined---------------     TREATMENT/INTERVENTION:  Modalities (Rationale):  to decrease pain and muscle guarding  MHP to neck for 15 minutes at end of session; no skin irritation noted  E stim to left cervical paraspinals and UT with MHP     Therapeutic Exercises:  HEP provided on evaluation:  KTC, LTR, Bridges, Cervical flexion and rotation over rolled towel, UT stretch, shoulder blade squeeze, Shoulder shrug, side bend  Added to HEP 5/4/18: Chin tucks, UT stretch, levator scapulae stretch, scalene stretch, lower cervical/upper thoracic stretch, sidelying lumbar rotation stretch, seated trunk rotation stretch, angry cat stretch     Exercises in BOLD performed this date:     UBE x5 min level 2     LTR  SKTC  Bridges  Single leg right rotation with 20 sec     AROM cervical spine in all directions: 5 reps B  UT stretch: 3 reps with 15 second holds  Levator scapulae stretch: 3 reps with 15 second holds  Lower cervical/upper thoracic stretch: 5 reps with 10 second holds  Shoulder shrugs: 10 reps  Chin tucks: 10 reps with 5 second holds in supine  Chin tucks against wall: 10 reps with 5 second holds    Forward flexion over blue physioball: 10 reps  Flexion/ext over back of chair x 7 reps  Thoracolumbar side bend with arms behind head: 5 reps with 5 second holds  Seated trunk rotation: 5 reps with 5 second holds    Quantum Rows 25#, sittin sets of 10 reps  Quantum pull downs 25#: 2 sets of 10 reps    Angry cat stretch: 10 reps with 5 second holds  Child's pose: 5 reps with 10 sec hold     Manual Therapy:   STM/trigger point release to left UT and cervical paraspinals  Manual cervical traction with suboccipital release in supine  PROM of cervical spine in all directions in supine   Instrument assisted soft tissue mobilization to left suboccipitals, cervical paraspinals, and UT in sitting over chair. Performed statically with slight tissue tension and with tissue on stretch (pain free range); also performed with patient actively moving head into right rotation and side bending. Patient educated on purpose and affect of intervention. Patient visualized affect post intervention and verbalized good understanding.     Neuro Re-Education:  Discussed sitting with lumbar support to realign sitting posture     Activity tolerance and post treatment pain report:   Good  Pain Out: 3/10    Education:  Education was provided to the patient on the following topics  [x]    No changes were made to the home exercise program.  []    The following changes were made to the home exercise program  Patient verbalized understanding of the topics presented. ASSESSMENT:   Patient presents with minimal to no pain in low back. Patient with continued left sided neck pain but overall decrease since evaluation. Patient most tender at left suboccipitals and UT. 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  He does report that he is able to take breaks and perform exercises at work as needed to manage pain. He reports good set-up of computer station. Occasional cervicogenic headaches remain. Patient does report that he has been able to decrease amount of pain medication that he is taking. Patients progression toward goals is as follows:  [x]     Improving appropriately and progressing toward goals  []     Improving slowly and progressing toward goals  []     Not making progress toward goals and plan of care will be adjusted    PLAN OF CARE:   Patient continues to benefit from skilled intervention to address the above impairments. [x]    Continue treatment per established plan of care.   []     Recommend the following changes to the plan of care    Recommendations/Intent for next treatment: IASTM to suboccipitals, discontinue e stim    Maxwell Quijano, PT   Time Calculation: 60 mins  Patient Time in clinic:   Start Time: 1030   Stop Time: 1130

## 2018-06-11 ENCOUNTER — HOSPITAL ENCOUNTER (OUTPATIENT)
Dept: PHYSICAL THERAPY | Age: 48
Discharge: HOME | End: 2018-06-11
Payer: COMMERCIAL

## 2018-06-11 PROCEDURE — 97110 THERAPEUTIC EXERCISES: CPT | Performed by: PHYSICAL THERAPIST

## 2018-06-11 PROCEDURE — 97140 MANUAL THERAPY 1/> REGIONS: CPT | Performed by: PHYSICAL THERAPIST

## 2018-06-11 NOTE — PROGRESS NOTES
[facility]  [address], [address] [facility]    OUTPATIENT physical Therapy DAILY Treatment NOTe  Visit: 13    NAME: Jovanni Galindo AGE: 57 y.o. GENDER: male  DATE: 6/11/2018  REFERRING PHYSICIAN: Az Carlton MD      GOALS  Short term goals  Time frame: 3 weeks  1. Patient will be compliant and independent with the initial HEP as evidenced by being able to perform without cueing. MET  2. Patient will report a 50% improvement in symptoms. MET  3. Patient report a 50% improvement in sleeping. MET  4. Patient will tolerate 15 minutes of clinic activities before onset of symptoms. MET     Long term goals  Time frame: 6 weeks  1. Patient will report pain level decrease to 2/10 to allow increased ease of movement. 2. Patient will have an improved score on the TXU Courtney outcome measure by 10 points to demonstrate an increase in functional activity tolerance. 3. Patient will return to work without being limited by symptoms. 4. Patient will sleep 6-8 hours without being interrupted by pain. SUBJECTIVE:   \"It's mainly just the left side of my neck\"    Pain In: 4/10 neck (left);1/10 mid/low back    OBJECTIVE DATA SUMMARY:   Objective data from initial evaluation:  EXAMINATION/PRESENTATION/DECISION MAKING:   Pain:  Location:neck, low back  Quality: aching and burning  Now:8/10  Best: 5/6, when resting or little activity  Factors that improve pain: rest, medication:used and beneficial     Posture:   Rounded shoulders and head forward     Strength:   B/L UE strength WNL     Range of Motion:   Trunk flex/ext 50% of normal painful  Right and Left SB 50% of normal painful     Spinal Assessment: WNL     Joint Mobility:   Lumbar hypomobility with P-A pressure  Cx distraction, gr 1 painful     Palpation:   TTP, suboccipitals, cx paraspinals,rhomboids, right latissimus dorsi, lumbar paraspinals     Neurologic Assessment:                         Tone:  WNL Sensation: normal                         Reflexes: not tested     Special Tests:    + B/L spurlings     Mobility:                         Transitional Movements: WNL                         Gait: WNL     Balance:   WNL     Functional Measure:      In compliance with CMSs Claims Based Outcome Reporting, the following G-code set was chosen for this patient based on their primary functional limitation being treated:     The outcome measure chosen to determine the severity of the functional limitation was the Standard [place] Pain Questionaire with a score of 13/24 which was correlated with the impairment scale.     · Mobility - Walking and Moving Around:                           - CURRENT STATUS:               CK - 40%-59% impaired, limited or restricted                          - GOAL STATUS:                      CI - 1%-19% impaired, limited or restricted                         - D/C STATUS:                           ---------------To be determined---------------     TREATMENT/INTERVENTION:  Modalities (Rationale):  to decrease pain and muscle guarding  MHP to neck for 15 minutes at end of session; no skin irritation noted  E stim to left cervical paraspinals and UT with MHP -held this date     Therapeutic Exercises:  HEP provided on evaluation:  KTC, LTR, Bridges, Cervical flexion and rotation over rolled towel, UT stretch, shoulder blade squeeze, Shoulder shrug, side bend  Added to HEP 5/4/18: Chin tucks, UT stretch, levator scapulae stretch, scalene stretch, lower cervical/upper thoracic stretch, sidelying lumbar rotation stretch, seated trunk rotation stretch, angry cat stretch     Exercises in BOLD performed this date:     UBE x5 min level 2     LTR  SKTC  Bridges  Single leg right rotation with 20 sec     AROM cervical spine in all directions: 5 reps B  UT stretch: 3 reps with 60 second holds  Levator scapulae stretch: 3 reps with 30 second holds  Lower cervical/upper thoracic stretch: 5 reps with 10 second holds  Shoulder shrugs: 10 reps  Chin tucks: 10 reps with 5 second holds in supine  Chin tucks against wall: 10 reps with 5 second holds    Forward flexion over blue physioball: 10 reps  Flexion/ext over back of chair x 7 reps  Thoracolumbar side bend with arms behind head: 5 reps with 5 second holds  Seated trunk rotation: 5 reps with 5 second holds    Quantum Rows 25#, sittin sets of 10 reps  Quantum pull downs 25#: 2 sets of 10 reps    Angry cat stretch: 10 reps with 5 second holds  Child's pose: 5 reps with 10 sec hold     Manual Therapy:   STM/trigger point release to left UT and cervical paraspinals  Manual cervical traction with suboccipital release in supine  PROM of cervical spine in all directions in supine   Instrument assisted soft tissue mobilization to left suboccipitals, cervical paraspinals, and UT in sitting over chair. Performed statically with slight tissue tension and with tissue on stretch (pain free range); also performed with patient actively moving head into right rotation and side bending. Patient educated on purpose and affect of intervention. Patient visualized affect post intervention and verbalized good understanding.     Neuro Re-Education:  Discussed sitting with lumbar support to realign sitting posture     Activity tolerance and post treatment pain report:   Good  Pain Out: 3/10    Education:  Education was provided to the patient on the following topics  [x]    No changes were made to the home exercise program.  []    The following changes were made to the home exercise program  Patient verbalized understanding of the topics presented. ASSESSMENT:   Patient presents with minimal to no pain in mid/low back. Patient with decreased left sided neck pain from last session. Patient most tender at left suboccipitals and UT. 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  He does report that he is able to take breaks and perform exercises at work as needed to manage pain. He reports good set-up of computer station. No headaches reported over the weekend. Patient tolerated exercises and manual therapy well. Patients progression toward goals is as follows:  [x]     Improving appropriately and progressing toward goals  []     Improving slowly and progressing toward goals  []     Not making progress toward goals and plan of care will be adjusted    PLAN OF CARE:   Patient continues to benefit from skilled intervention to address the above impairments. [x]    Continue treatment per established plan of care.   []     Recommend the following changes to the plan of care    Recommendations/Intent for next treatment: IASTM to suboccipitals    Shantel Sotomayor, PT   Time Calculation: 35 mins  Patient Time in clinic:   Start Time: 1030   Stop Time: [phone]

## 2018-06-14 ENCOUNTER — HOSPITAL ENCOUNTER (OUTPATIENT)
Dept: PHYSICAL THERAPY | Age: 48
Discharge: HOME | End: 2018-06-14
Payer: COMMERCIAL

## 2018-06-14 PROCEDURE — 97110 THERAPEUTIC EXERCISES: CPT

## 2018-06-14 PROCEDURE — 97140 MANUAL THERAPY 1/> REGIONS: CPT

## 2018-06-14 PROCEDURE — 97014 ELECTRIC STIMULATION THERAPY: CPT

## 2018-06-14 NOTE — PROGRESS NOTES
[facility]  [address], [address] [facility]    OUTPATIENT physical Therapy DAILY Treatment NOTe  Visit: 14    NAME: Telly Avila AGE: 57 y.o. GENDER: male  DATE: 6/14/2018  REFERRING PHYSICIAN: Damaso Fortune MD      GOALS  Short term goals  Time frame: 3 weeks  1. Patient will be compliant and independent with the initial HEP as evidenced by being able to perform without cueing. MET  2. Patient will report a 50% improvement in symptoms. MET  3. Patient report a 50% improvement in sleeping. MET  4. Patient will tolerate 15 minutes of clinic activities before onset of symptoms. MET     Long term goals  Time frame: 6 weeks  1. Patient will report pain level decrease to 2/10 to allow increased ease of movement. 2. Patient will have an improved score on the TXU Courtney outcome measure by 10 points to demonstrate an increase in functional activity tolerance. MET  3. Patient will return to work without being limited by symptoms. 4. Patient will sleep 6-8 hours without being interrupted by pain. MET       SUBJECTIVE:   \"It isn't as bad as it was. It is still there but not an unbearable pain. \"   \"It catches me sometimes when I turn quickly. \"    Pain In: 4-5/10 neck (left);1/10 mid/low back    OBJECTIVE DATA SUMMARY:   Objective data from initial evaluation:  EXAMINATION/PRESENTATION/DECISION MAKING:   Pain:  Location:neck, low back  Quality: aching and burning  Now:8/10  Best: 5/6, when resting or little activity  Factors that improve pain: rest, medication:used and beneficial     Posture:   Rounded shoulders and head forward     Strength:   B/L UE strength WNL     Range of Motion:   Trunk flex/ext 50% of normal painful  Right and Left SB 50% of normal painful     Spinal Assessment:    WNL     Joint Mobility:   Lumbar hypomobility with P-A pressure  Cx distraction, gr 1 painful     Palpation:   TTP, suboccipitals, cx paraspinals,rhomboids, right latissimus dorsi, lumbar paraspinals     Neurologic Assessment:                         Tone: WNL                         Sensation: normal                         Reflexes: not tested     Special Tests:    + B/L spurlings     Mobility:                         Transitional Movements: WNL                         Gait: WNL     Balance:   WNL     Functional Measure:   Final Harpreet Padgett 0/24     In compliance with CMSs Claims Based Outcome Reporting, the following G-code set was chosen for this patient based on their primary functional limitation being treated:     The outcome measure chosen to determine the severity of the functional limitation was the Standard [place] Pain Questionaire with a score of 13/24 which was correlated with the impairment scale.     · Mobility - Walking and Moving Around:                           - CURRENT STATUS:               CK - 40%-59% impaired, limited or restricted                          - GOAL STATUS:                      CI - 1%-19% impaired, limited or restricted                         - D/C STATUS:                           ---------------To be determined---------------     TREATMENT/INTERVENTION:  Modalities (Rationale):  to decrease pain and muscle guarding  MHP to neck for 15 minutes at end of session; no skin irritation noted  E stim to left cervical paraspinals and UT with MHP -held this date     Therapeutic Exercises:  HEP provided on evaluation:  KTC, LTR, Bridges, Cervical flexion and rotation over rolled towel, UT stretch, shoulder blade squeeze, Shoulder shrug, side bend  Added to HEP 5/4/18: Chin tucks, UT stretch, levator scapulae stretch, scalene stretch, lower cervical/upper thoracic stretch, sidelying lumbar rotation stretch, seated trunk rotation stretch, angry cat stretch     Exercises in BOLD performed this date:     UBE x5 min level 2     LTR  SKTC  Bridges  Single leg right rotation with 20 sec     AROM cervical spine in all directions: 5 reps B  UT stretch: 3 reps with 60 second holds  Levator scapulae stretch: 3 reps with 30 second holds  Lower cervical/upper thoracic stretch: 5 reps with 10 second holds  Shoulder shrugs: 10 reps  Chin tucks: 10 reps with 5 second holds in supine  Chin tucks against wall: 10 reps with 5 second holds    Forward flexion over blue physioball: 10 reps  Isometric cervical flexion with 10 second hold x4 (increased discomfort and deferred by this writer)  Flexion/ext over back of chair x 7 reps  Thoracolumbar side bend with arms behind head: 5 reps with 5 second holds  Seated trunk rotation: 5 reps with 5 second holds    Quantum Rows 25#, sittin sets of 10 reps  Quantum pull downs 25#: 2 sets of 10 reps    Angry cat stretch: 10 reps with 5 second holds  Child's pose: 5 reps with 10 sec hold     Manual Therapy:   STM/trigger point release to left UT and cervical paraspinals  Manual cervical traction with suboccipital release in supine  PROM of cervical spine in all directions in supine   Instrument assisted soft tissue mobilization to left suboccipitals, cervical paraspinals, and UT in sitting over chair. Performed statically with slight tissue tension and with tissue on stretch (pain free range); also performed with patient actively moving head into right rotation and side bending. Patient educated on purpose and affect of intervention. Patient visualized affect post intervention and verbalized good understanding.     Neuro Re-Education:  Discussed sitting with lumbar support to realign sitting posture     Activity tolerance and post treatment pain report:   Good  Pain Out: 3/10    Education:  Education was provided to the patient on the following topics  [x]    No changes were made to the home exercise program.  []    The following changes were made to the home exercise program  Patient verbalized understanding of the topics presented. ASSESSMENT:   Patient reports significant improvement in pain since onset of intervention.  His low back pain is much improved as is his shoulder discomfort. Is final Les Slain score was a 0/24 down from 13/24 on evaluation. His remaining c/o is left high cervical sub occipital muscular discomfort. He has been provided with an extensive home exercise program which he is compliant with and is able to utilize to relieve the worst of his discomfort. He is scheduled for an appointment with his MD this pm and discussed planning to discharge unless he further contacts the clinic with concerns. Patients progression toward goals is as follows:  [x]     Improving appropriately and progressing toward goals  []     Improving slowly and progressing toward goals  []     Not making progress toward goals and plan of care will be adjusted    PLAN OF CARE:   Patient will be discharged from physical therapy at this time.   Criteria for termination of care:  []   Goals Achieved  [x]   Plateau Reached  []   Patient has not returned  []   Other:   Plan for follow up, continuing care, or equipment recommendations: patient to follow up with MD and plan to discharge unless he contacts clinic  Thank you for this referral.        Real Jara, PT, DPT   Time Calculation: 73 mins  Patient Time in clinic:   Start Time: 1500   Stop Time: 8455

## 2018-08-07 NOTE — PROGRESS NOTES
[facility]  [address], [address] [facility]    OUTPATIENT physical Therapy discharge note      8/7/2018:  Patient will be discharged from physical therapy at this time. Criteria for termination of care:    []           Patient has plateaued  []           Patient has not returned to therapy  []           Patient has missed three or more visits without prior notification  [x]           Other: plan was to discharge per last treatment note    Patient was seen for 14 visits from 4/30/18 to 6/14/18. Please refer to the most recent progress note for the latest PT info available. If you need anything further faxed to you, please contact us at [phone].     Thank you for this referral.  Camilo Monroe, PT

## 2022-10-24 ENCOUNTER — ANESTHESIA EVENT (OUTPATIENT)
Dept: ENDOSCOPY | Age: 52
End: 2022-10-24
Payer: COMMERCIAL

## 2022-10-24 ENCOUNTER — HOSPITAL ENCOUNTER (OUTPATIENT)
Age: 52
Setting detail: OUTPATIENT SURGERY
Discharge: HOME | End: 2022-10-24
Attending: INTERNAL MEDICINE | Admitting: INTERNAL MEDICINE
Payer: COMMERCIAL

## 2022-10-24 ENCOUNTER — ANESTHESIA (OUTPATIENT)
Dept: ENDOSCOPY | Age: 52
End: 2022-10-24
Payer: COMMERCIAL

## 2022-10-24 VITALS
WEIGHT: 177.8 LBS | HEART RATE: 65 BPM | HEIGHT: 72 IN | SYSTOLIC BLOOD PRESSURE: 174 MMHG | BODY MASS INDEX: 24.08 KG/M2 | DIASTOLIC BLOOD PRESSURE: 86 MMHG | RESPIRATION RATE: 14 BRPM | OXYGEN SATURATION: 99 % | TEMPERATURE: 97.9 F

## 2022-10-24 PROCEDURE — 76060000031 HC ANESTHESIA FIRST 0.5 HR: Performed by: INTERNAL MEDICINE

## 2022-10-24 PROCEDURE — 76040000019: Performed by: INTERNAL MEDICINE

## 2022-10-24 PROCEDURE — 74011250636 HC RX REV CODE- 250/636: Performed by: INTERNAL MEDICINE

## 2022-10-24 PROCEDURE — 2709999900 HC NON-CHARGEABLE SUPPLY: Performed by: INTERNAL MEDICINE

## 2022-10-24 PROCEDURE — 74011250636 HC RX REV CODE- 250/636: Performed by: NURSE ANESTHETIST, CERTIFIED REGISTERED

## 2022-10-24 PROCEDURE — 74011250637 HC RX REV CODE- 250/637: Performed by: INTERNAL MEDICINE

## 2022-10-24 PROCEDURE — 74011000250 HC RX REV CODE- 250: Performed by: NURSE ANESTHETIST, CERTIFIED REGISTERED

## 2022-10-24 RX ORDER — DEXTROMETHORPHAN/PSEUDOEPHED 2.5-7.5/.8
1.2 DROPS ORAL
Status: DISCONTINUED | OUTPATIENT
Start: 2022-10-24 | End: 2022-10-24 | Stop reason: HOSPADM

## 2022-10-24 RX ORDER — NALOXONE HYDROCHLORIDE 0.4 MG/ML
0.4 INJECTION, SOLUTION INTRAMUSCULAR; INTRAVENOUS; SUBCUTANEOUS
Status: DISCONTINUED | OUTPATIENT
Start: 2022-10-24 | End: 2022-10-24 | Stop reason: HOSPADM

## 2022-10-24 RX ORDER — SODIUM CHLORIDE 0.9 % (FLUSH) 0.9 %
5-40 SYRINGE (ML) INJECTION AS NEEDED
Status: DISCONTINUED | OUTPATIENT
Start: 2022-10-24 | End: 2022-10-24 | Stop reason: HOSPADM

## 2022-10-24 RX ORDER — LIDOCAINE HYDROCHLORIDE 20 MG/ML
INJECTION, SOLUTION EPIDURAL; INFILTRATION; INTRACAUDAL; PERINEURAL AS NEEDED
Status: DISCONTINUED | OUTPATIENT
Start: 2022-10-24 | End: 2022-10-24 | Stop reason: HOSPADM

## 2022-10-24 RX ORDER — MIDAZOLAM HYDROCHLORIDE 1 MG/ML
5 INJECTION, SOLUTION INTRAMUSCULAR; INTRAVENOUS
Status: DISCONTINUED | OUTPATIENT
Start: 2022-10-24 | End: 2022-10-24 | Stop reason: HOSPADM

## 2022-10-24 RX ORDER — FENTANYL CITRATE 50 UG/ML
100 INJECTION, SOLUTION INTRAMUSCULAR; INTRAVENOUS ONCE
Status: DISCONTINUED | OUTPATIENT
Start: 2022-10-24 | End: 2022-10-24 | Stop reason: HOSPADM

## 2022-10-24 RX ORDER — FLUMAZENIL 0.1 MG/ML
0.2 INJECTION INTRAVENOUS
Status: DISCONTINUED | OUTPATIENT
Start: 2022-10-24 | End: 2022-10-24 | Stop reason: HOSPADM

## 2022-10-24 RX ORDER — ATROPINE SULFATE 0.1 MG/ML
0.5 INJECTION INTRAVENOUS
Status: DISCONTINUED | OUTPATIENT
Start: 2022-10-24 | End: 2022-10-24 | Stop reason: HOSPADM

## 2022-10-24 RX ORDER — SODIUM CHLORIDE 0.9 % (FLUSH) 0.9 %
5-40 SYRINGE (ML) INJECTION EVERY 8 HOURS
Status: DISCONTINUED | OUTPATIENT
Start: 2022-10-24 | End: 2022-10-24 | Stop reason: HOSPADM

## 2022-10-24 RX ORDER — SODIUM CHLORIDE 9 MG/ML
100 INJECTION, SOLUTION INTRAVENOUS CONTINUOUS
Status: DISCONTINUED | OUTPATIENT
Start: 2022-10-24 | End: 2022-10-24 | Stop reason: HOSPADM

## 2022-10-24 RX ORDER — LIDOCAINE HYDROCHLORIDE 20 MG/ML
5 SOLUTION OROPHARYNGEAL AS NEEDED
Status: DISCONTINUED | OUTPATIENT
Start: 2022-10-24 | End: 2022-10-24 | Stop reason: HOSPADM

## 2022-10-24 RX ORDER — DEXTROSE MONOHYDRATE AND SODIUM CHLORIDE 5; .9 G/100ML; G/100ML
100 INJECTION, SOLUTION INTRAVENOUS CONTINUOUS
Status: DISCONTINUED | OUTPATIENT
Start: 2022-10-24 | End: 2022-10-24 | Stop reason: HOSPADM

## 2022-10-24 RX ORDER — PROPOFOL 10 MG/ML
INJECTION, EMULSION INTRAVENOUS AS NEEDED
Status: DISCONTINUED | OUTPATIENT
Start: 2022-10-24 | End: 2022-10-24 | Stop reason: HOSPADM

## 2022-10-24 RX ORDER — EPINEPHRINE 0.1 MG/ML
1 INJECTION INTRACARDIAC; INTRAVENOUS
Status: DISCONTINUED | OUTPATIENT
Start: 2022-10-24 | End: 2022-10-24 | Stop reason: HOSPADM

## 2022-10-24 RX ADMIN — PROPOFOL 50 MG: 10 INJECTION, EMULSION INTRAVENOUS at 09:11

## 2022-10-24 RX ADMIN — LIDOCAINE HYDROCHLORIDE 50 MG: 20 INJECTION, SOLUTION EPIDURAL; INFILTRATION; INTRACAUDAL; PERINEURAL at 09:10

## 2022-10-24 RX ADMIN — PROPOFOL 70 MG: 10 INJECTION, EMULSION INTRAVENOUS at 09:10

## 2022-10-24 RX ADMIN — SODIUM CHLORIDE: 0.9 INJECTION, SOLUTION INTRAVENOUS at 09:04

## 2022-10-24 RX ADMIN — PROPOFOL 30 MG: 10 INJECTION, EMULSION INTRAVENOUS at 09:19

## 2022-10-24 RX ADMIN — PROPOFOL 40 MG: 10 INJECTION, EMULSION INTRAVENOUS at 09:14

## 2022-10-24 RX ADMIN — SIMETHICONE 80 MG: 20 SUSPENSION/ DROPS ORAL at 09:13

## 2022-10-24 RX ADMIN — PROPOFOL 40 MG: 10 INJECTION, EMULSION INTRAVENOUS at 09:16

## 2022-10-24 NOTE — PROCEDURES
G I Procedure Note            COLONOSCOPY   Dr. Yakov Perry office   [facility]    [phone] [address]                                   [id number]                                  [id number]   1970                                      50 y.o.                                    male      Procedure Date: 10/24/2022   [x]  Anesthesia MAC                                                                                                Pre Op Diagnosis:    Indications:                   1. SCREENING                                                                                                                                                                          Post Op Diagnosis:                    1.   Colon: Hemorrhoids                                                                             H&p completed: Yes            Anesthesia Assessment: Performed prior to procedure:      No change  Anesthesia Plan: Performed prior to procedure:                   No change       Medications: See Reviewed List and Reconcilation           Informed consent was obtained     Risk Statement:  Prior to the procedure the risks were explained to the patient and/or to the family including but not limited to perforation, bleeding, adverse drug reaction, aspiration, and even the need for possible surgery. A colonoscopy exam is not 100% accurate which may be related to preparation or blind spots during the exam.The possibility that an abnormality and /or cancer could be missed was also discussed as well as alternative x-ray options.          Instrument:    Olympus adult Videocolonoscope                                   Immediate Procedure Reassessment Completed     With the patient in the left lateral position, a rectal examination was performed and the findings were: negative without mass, lesions or tenderness   The Olympus Video colonoscope was inserted under direct vision into the rectum. The colonoscope was passed from the rectum to the cecum, which was identified by the ileocecal valve. The colon findings demonstrated:  ANUS: Anal exam reveals no masses or external hemorrhoids, sphincter tone is normal.   RECTUM: Rectal exam reveals no masses  . SIGMOID COLON: The sigmoid was unremarkable except as noted below   Findings below   DESCENDING COLON:  The videoscolonoscope was advanced carefully. Findings below  SPLENIC FLEXURE: The splenic flexure is normal.   TRANSVERSE COLON:  The typical triangular pattern was noted. Findings below      HEPATIC FLEXURE: The hepatic flexure is normal.   ASCENDING COLON:  No  bleeding Findings below     CECUM:  The ileocecal valve appears normal.   TERMINAL ILEUM: The terminal ileum was not entered. Finding noted      [x] mucosa normal      [] Diverticulosis     [] avm     [] Additional findings: The colonoscope was slowly withdrawn >6 minute period and the instrument was retroflexed in the rectum. The rectal findings were:Protruding lesions:     -Internal Hemorrhoids  The patient tolerated the entire procedure well. Blood Loss nil  No complications  Anesthesia  MAC  No crystalloids  No Implants  Assistants : per nursing documentation team members     For biopsy  Specimen verification by physician and nurse two sources, name,           social security numbers     Colon preparation was good    Recommendations:     - For colon cancer screening in this average-risk patient, colonoscopy may be repeated in 10 years.       Copies sent to   Donis Mcneil MD  CC:  None

## 2022-10-24 NOTE — PROGRESS NOTES
TRANSFER - IN REPORT:    Verbal report received from [facility] RN(name) on Ruchi Willams  being received from endo(unit) for routine progression of care      Report consisted of patients Situation, Background, Assessment and   Recommendations(SBAR). Information from the following report(s) SBAR was reviewed with the receiving nurse. Opportunity for questions and clarification was provided. Assessment completed upon patients arrival to unit and care assumed.

## 2022-10-24 NOTE — ANESTHESIA POSTPROCEDURE EVALUATION
Procedure(s):  COLONOSCOPY. MAC    Anesthesia Post Evaluation        Patient location during evaluation: PACU  Note status: Adequate. Level of consciousness: responsive to verbal stimuli and sleepy but conscious  Pain management: satisfactory to patient  Airway patency: patent  Anesthetic complications: no  Cardiovascular status: acceptable  Respiratory status: acceptable  Hydration status: acceptable  Comments: +Post-Anesthesia Evaluation and Assessment    Patient: Trey Meadows MRN: [de-identified]  SSN: [id number]   YOB: 1970  Age: 50 y.o. Sex: male      Cardiovascular Function/Vital Signs    BP (!) 168/91   Pulse 71   Temp 36.6 °C (97.9 °F)   Resp 17   Ht 5' 11.5\" (1.816 m)   Wt 80.6 kg (177 lb 12.8 oz)   SpO2 100%   BMI 24.45 kg/m²     Patient is status post Procedure(s):  COLONOSCOPY. Nausea/Vomiting: Controlled. Postoperative hydration reviewed and adequate. Pain:  Pain Scale 1: Numeric (0 - 10) (10/24/22 0834)  Pain Intensity 1: 0 (10/24/22 0834)   Managed. Neurological Status: At baseline. Mental Status and Level of Consciousness: Arousable. Pulmonary Status:   O2 Device: Nasal cannula (10/24/22 0928)   Adequate oxygenation and airway patent. Complications related to anesthesia: None    Post-anesthesia assessment completed. No concerns. Signed By: William Fraser MD    10/24/2022  Post anesthesia nausea and vomiting:  controlled      INITIAL Post-op Vital signs:   Vitals Value Taken Time   /91 10/24/22 0945   Temp 36.6 °C (97.9 °F) 10/24/22 0931   Pulse 75 10/24/22 0946   Resp 20 10/24/22 0946   SpO2 100 % 10/24/22 0946   Vitals shown include unvalidated device data.

## 2022-10-24 NOTE — PERIOP NOTES
8734: See anesthesia note and MAR for medications given during procedure. Received report from anesthesia staff on vital signs and status of patient.      Endoscope was pre-cleaned at the bedside immediately following procedure by WILLIAM Dempsey

## 2022-10-24 NOTE — DISCHARGE INSTRUCTIONS
Endoscopy Discharge Instructions     Dr. Rene Medici office                                            NAME: Val Lopez RECORD [de-identified]    AGE:  50 y.o. YOB: 1970                                                              FINAL Discharge Procedure and Diagnosis:       Procedure(s):  COLONOSCOPY       FINDINGS:     hemorrhoids                                        MEDICATIONS    [x] CONTINUE CURRENT MEDICATIONS     [] NEW MEDICATIONS           1.    2.    3.         Testing   Schedule              Colonoscopy Screening                                   Recommendations       []  Repeat colonoscopy in 6-12 month         2nd to Inadequate  prep    []  Repeat colonoscopy in 3 years    []  Repeat colonoscopy in 5 years    [x]  Repeat colonoscopy in 10 years         New additional  Tests  Call the office   [phone] for the appointment time      []      []      []                                     YOUR NEXT APPOINTMENT WITH DR Marin Miller:                                                                                                                                [x]   None follow up with pcp   []  1 week       []   2 week    []  1 month    Always keep KEEP  APPOINTMENT WITH  @PCP@ for regular medical follow up                                                                                                                         If you had a colonoscopy the \"C\" indicates specific instructions        x                                           Diet Instructions :   Ordinarily you may resume your previous diet but your initial diet should be       Light your discharge nurse will go over this with you. Large meals can cause  abdominal discomfort after these procedures. Specific Diet Recommendations:        [x] High fiber diet. https://www.[employer]. com/diets/        [] GERD diet: avoid fried and fatty foods, peppermint, chocolate, alcohol,               coffee, citrus fruits and juices, and tomato products. Avoid lying down for            2 to 3  hours after eating. https://www.anderson.com/. Backblaze/diets/            []  FODMAP DIET  [employer]nl              []  All diets eg high fiber, gastroparesis. , weight loss , gluten free             1. boarding pass              2.  [email or web address]://[email or web address]/. Backblaze/diets/           __x__  Belem Mech may feel quite tired and need to rest and recuperate for several hours    following these procedures. __x__  Due to the fact that sedation was administered for this procedure, do not drive,   operate machinery or sign legal documents for the next 24 hours. __x__  Mild abdominal pain may be experienced after your procedure, but is should   disappear after several hours. Notify your physician if you have persistent pain,   tenderness or abdominal distension. __x__  C    Many patients for the first few hours following the exam may experience         belching or passing gas through the rectum. Walking may help to relieve        distention and gas pains. A warm bath or shower will often help with abdominal  cramping.                                                                                            __x__   Belem Dela Cruzh may return to your normal routine tomorrow, according to how you feel        and depending on your doctors instructions. Be sure to call your doctor to make  an appointment for a post-surgery check-up on the date your doctor has   requested. __x__ C     Rectal bleeding or spotting in small amounts may occur with the first bowel   movement following a colonoscopy or sigmoidoscopy.  If a large amount of blood is noted call immediately     __x__ You may experience a numbness or lack of sensation in throat. If present, do not     eat or drink. Before eating, test your ability to drink with small sips of water. Y     You may try clear liquids or soups. If you tolerate these, you may then eat solid     food which is not greasy or spicy. __x__ C     IF POLYPS REMOVED: Avoid any blood thinning medication such as plavix,   aspirin or coumadin  NSAIDS (like advil or alleve) for 7 days. __x__  Notify your physician if you cough or vomit blood or experience chest pain. Your biopsy or testing result should be available in 7-10 days                                                                                                                      Prescription will be electronically sent to your pharmacy you must     let your nurse know your pharmacy:                                                                                                                                          [address] [place] [address]. TO HELP ENSURE A SMOOTH RECOVERY,       IT IS IMPORTANT TO FOLLOW THEM. _x___Pamphlet /Educational Information provided for diagnostic findings     Additional education information can assessed at the sites below:   [employer]   http://www.digestive. niddk.nih.gov/ddiseases/a-z.asp      Web MD patient information                                                                                                Signature of individual given instructions :   Date: 10/24/2022                                                                                                                              Patient Education on Sedation / Analgesia Administered for Procedure      For 24 hours after general anesthesia or intravenous analgesia / sedation:  Have someone responsible help you with your care  Limit your activities  Do not drive and operate hazardous machinery  Do not make important personal, legal or business decisions  Do not drink alcoholic beverages  If you have not urinated within 8 hours after discharge, please contact your physician  Resume your medications unless otherwise instructed    For 24 hours after general anesthesia or intravenous analgesia / sedation  you may experience:  Drowsiness, dizziness, sleepiness, or confusion  Difficulty remembering or delayed reaction times  Difficulty with your balance, especially while walking, move slowly and carefully, do not make sudden position changes  Difficulty focusing or blurred vision    You may not be aware of slight changes in your behavior and/or your reaction time because of the medication used during and after your procedure.     Report the following to your physician:  Excessive pain, swelling, redness or odor of or around the surgical area  Temperature over 100.5  Nausea and vomiting lasting longer than 4 hours or if unable to take medications  Any signs of decreased circulation or nerve impairment to extremity: change in color, persistent numbness, tingling, coldness or increase pain  Any questions or concerns    IF YOU REPORT TO AN EMERGENCY ROOM, DOCTOR'S OFFICE OR HOSPITAL WITHIN 24 HOURS AFTER YOUR PROCEDURE, BRING THIS SHEET AND YOUR AFTER VISIT SUMMARY WITH YOU AND GIVE IT TO THE PHYSICIAN OR NURSE ATTENDING YOU.

## 2022-10-24 NOTE — H&P
G I Procedure Note           Endoscopy History and Physical           Dr. Harmony Saxena [address] [facility] [address] [facility] [address] [de-identified]  [id number]    1970  50 y.o.  male      Date of Procedure:   Preoperative Diagnosis:       Procedure:   10/24/2022      SCREENING                         Procedure(s):  COLONOSCOPY      Gastroenterologist:  Anesthesia:           MD ELYSIA Arenas            History and procedure indication:  Ej Shannon is a 50 y.o. BLACK/ male who presents with: SCREENING   including the additional history of Screening ,Screening for colon cancer,,        Past Medical History:   Diagnosis Date    Hypertension     Ill-defined condition     sarcodosis      Prior to Admission medications    Medication Sig Start Date End Date Taking? Authorizing Provider   lisinopriL (PRINIVIL, ZESTRIL) 10 mg tablet Take 10 mg by mouth daily. Yes Provider, Historical     Allergies   Allergen Reactions    Floxacillin Rash       History reviewed. No pertinent surgical history. History reviewed. No pertinent family history. Social History     Tobacco Use    Smoking status: Never    Smokeless tobacco: Never   Substance Use Topics    Alcohol use: Not Currently     Comment: occasional                                                      PHYSICAL EXAM   There were no vitals taken for this visit.     General appearance:  alert,  in no distress  Mental status:  normal mood, behavior, speech, dress, motor activity and thought processes  Nose:      normal and patent, no erythema, discharge    Mouth:- mucous membranes moist, pharynx normal without lesions                  [x]  No Loose teeth      []    Loose teeth  Finger opening:  []1     []1.5    [] 2     [] 2.5     [x] 3      [] 3.5     [] 4   Mallampati:         [] Class 1     [x] Class 2    [] Class 3      [] Class 4      Neck - supple,      [x] Full ROM [] Decreased ROM  [] Short Neck no significant adenopathy    Chest - clear to auscultation, no wheezes, rales or rhonchi, symmetric air entry  Heart: normal rate, regular rhythm, normal S1, S2, no murmurs,   Abdomen: abdomen soft, bowel sounds  [x] normal  [] increased  [] hypoactive                  [x] no tenderness  [] epigastric tenderness  [] LLQ tenderness   [] RLQ tenderness                      No masses, organomegaly or guarding. Rectal exam: negative without mass, lesions or tenderness  Extremities:  , no pedal edema, no  cyanosis  Neurologic: Alert and oriented to person, place, and time;                          Normal symmetric reflexes  Normal gait:                                      Assessement:                                 Pre op dx:  SCREENING   Additional medical problems list below   There is no problem list on file for this patient. This note documentation was performed prior to this planned procedure       after a history and physical was performed in the office.          Date: 10 4 22   Office exam   10/24/2022 Immediate update no changes in H&P                        Pre Procedure Evaluation (per anesthesia or per h&p)                                                Sedation/Assessment:                                                                                               Mallampati Classification                            []Class 1                    []Class 2                    [] Class 3                  [] Class 4                                              ASA classfication         []     Class I: Normally healthy         []     Class II: Patient with mild systemic disease (e.g. hypertension)         []     Class III: Patient with severe systemic disease (e.g. CHF), non-decompensated         []     Class IV: Patient with severe systemic disease, decompensated         []     Class V: Moribund patient, survival unlikely                     Plan:   []    Egd                                [x]  Colonoscopy                                [] with Moderate Sedation /Conscious Sedation                                  [x] MAC          Patient stable for planned procedure. See orders.      Carlos Mueller MD

## 2022-10-24 NOTE — ANESTHESIA PREPROCEDURE EVALUATION
Relevant Problems   No relevant active problems       Anesthetic History   No history of anesthetic complications            Review of Systems / Medical History  Patient summary reviewed and pertinent labs reviewed    Pulmonary  Within defined limits                 Neuro/Psych   Within defined limits           Cardiovascular    Hypertension              Exercise tolerance: >4 METS     GI/Hepatic/Renal  Within defined limits              Endo/Other  Within defined limits           Other Findings   Comments: Sarcoidosis            Physical Exam    Airway  Mallampati: II  TM Distance: > 6 cm  Neck ROM: normal range of motion   Mouth opening: Normal     Cardiovascular  Regular rate and rhythm,  S1 and S2 normal,  no murmur, click, rub, or gallop  Rhythm: regular  Rate: normal         Dental  No notable dental hx       Pulmonary  Breath sounds clear to auscultation               Abdominal  GI exam deferred       Other Findings            Anesthetic Plan    ASA: 2  Anesthesia type: MAC          Induction: Intravenous  Anesthetic plan and risks discussed with: Patient

## (undated) DEVICE — HYPODERMIC SAFETY NEEDLE: Brand: MAGELLAN

## (undated) DEVICE — SYR 3ML LL TIP 1/10ML GRAD --

## (undated) DEVICE — 1200 GUARD II KIT W/5MM TUBE W/O VAC TUBE: Brand: GUARDIAN

## (undated) DEVICE — ELECTRODE,RADIOTRANSLUCENT,FOAM,5PK: Brand: MEDLINE

## (undated) DEVICE — SOLIDIFIER FLD 2OZ 1500CC N DISINF IN BTL DISP SAFESORB

## (undated) DEVICE — Device

## (undated) DEVICE — BASIN EMSIS 16OZ GRAPHITE PLAS KID SHP MOLD GRAD FOR ORAL

## (undated) DEVICE — SYR 10ML LUER LOK 1/5ML GRAD --

## (undated) DEVICE — TOWEL 4 PLY TISS 19X30 SUE WHT

## (undated) DEVICE — Z DISCONTINUED PER MEDLINE LINE GAS SAMPLING O2/CO2 LNG AD 13 FT NSL W/ TBNG FILTERLINE

## (undated) DEVICE — CATH IV AUTOGRD BC PNK 20GA 25 -- INSYTE

## (undated) DEVICE — SET ADMIN 16ML TBNG L100IN 2 Y INJ SITE IV PIGGY BK DISP (ORDER IN MULIPLES OF 48)

## (undated) DEVICE — NEONATAL-ADULT SPO2 SENSOR: Brand: NELLCOR